# Patient Record
Sex: MALE | Race: BLACK OR AFRICAN AMERICAN | Employment: UNEMPLOYED | URBAN - METROPOLITAN AREA
[De-identification: names, ages, dates, MRNs, and addresses within clinical notes are randomized per-mention and may not be internally consistent; named-entity substitution may affect disease eponyms.]

---

## 2017-01-01 ENCOUNTER — HOSPITAL ENCOUNTER (INPATIENT)
Age: 57
LOS: 4 days | DRG: 720 | End: 2017-07-14
Attending: EMERGENCY MEDICINE | Admitting: INTERNAL MEDICINE
Payer: MEDICAID

## 2017-01-01 ENCOUNTER — APPOINTMENT (OUTPATIENT)
Dept: GENERAL RADIOLOGY | Age: 57
DRG: 720 | End: 2017-01-01
Attending: INTERNAL MEDICINE
Payer: MEDICAID

## 2017-01-01 ENCOUNTER — APPOINTMENT (OUTPATIENT)
Dept: CT IMAGING | Age: 57
DRG: 720 | End: 2017-01-01
Attending: EMERGENCY MEDICINE
Payer: MEDICAID

## 2017-01-01 ENCOUNTER — APPOINTMENT (OUTPATIENT)
Dept: ULTRASOUND IMAGING | Age: 57
DRG: 720 | End: 2017-01-01
Attending: EMERGENCY MEDICINE
Payer: MEDICAID

## 2017-01-01 ENCOUNTER — APPOINTMENT (OUTPATIENT)
Dept: ULTRASOUND IMAGING | Age: 57
DRG: 720 | End: 2017-01-01
Attending: INTERNAL MEDICINE
Payer: MEDICAID

## 2017-01-01 ENCOUNTER — APPOINTMENT (OUTPATIENT)
Dept: MRI IMAGING | Age: 57
DRG: 720 | End: 2017-01-01
Attending: INTERNAL MEDICINE
Payer: MEDICAID

## 2017-01-01 VITALS
HEIGHT: 70 IN | DIASTOLIC BLOOD PRESSURE: 46 MMHG | HEART RATE: 30 BPM | BODY MASS INDEX: 23.62 KG/M2 | TEMPERATURE: 96 F | SYSTOLIC BLOOD PRESSURE: 74 MMHG | WEIGHT: 165 LBS

## 2017-01-01 DIAGNOSIS — F10.288 ALCOHOL DEPENDENCE WITH OTHER ALCOHOL-INDUCED DISORDER (HCC): ICD-10-CM

## 2017-01-01 DIAGNOSIS — R53.81 DEBILITY: ICD-10-CM

## 2017-01-01 DIAGNOSIS — R45.1 AGITATION: ICD-10-CM

## 2017-01-01 DIAGNOSIS — K86.89 PANCREATIC MASS: ICD-10-CM

## 2017-01-01 DIAGNOSIS — Z71.89 COUNSELING REGARDING ADVANCED CARE PLANNING AND GOALS OF CARE: ICD-10-CM

## 2017-01-01 DIAGNOSIS — R41.82 ALTERED MENTAL STATUS, UNSPECIFIED ALTERED MENTAL STATUS TYPE: ICD-10-CM

## 2017-01-01 DIAGNOSIS — K76.82 HEPATIC ENCEPHALOPATHY: Primary | ICD-10-CM

## 2017-01-01 LAB
ALBUMIN SERPL BCP-MCNC: 1 G/DL (ref 3.5–5)
ALBUMIN SERPL BCP-MCNC: 1.1 G/DL (ref 3.5–5)
ALBUMIN SERPL BCP-MCNC: 1.1 G/DL (ref 3.5–5)
ALBUMIN SERPL BCP-MCNC: 1.4 G/DL (ref 3.5–5)
ALBUMIN SERPL BCP-MCNC: 1.6 G/DL (ref 3.5–5)
ALBUMIN/GLOB SERPL: 0.2 {RATIO} (ref 1.1–2.2)
ALBUMIN/GLOB SERPL: 0.2 {RATIO} (ref 1.1–2.2)
ALBUMIN/GLOB SERPL: 0.3 {RATIO} (ref 1.1–2.2)
ALBUMIN/GLOB SERPL: 0.3 {RATIO} (ref 1.1–2.2)
ALP SERPL-CCNC: 105 U/L (ref 45–117)
ALP SERPL-CCNC: 131 U/L (ref 45–117)
ALP SERPL-CCNC: 155 U/L (ref 45–117)
ALP SERPL-CCNC: 73 U/L (ref 45–117)
ALT SERPL-CCNC: 314 U/L (ref 12–78)
ALT SERPL-CCNC: 33 U/L (ref 12–78)
ALT SERPL-CCNC: 47 U/L (ref 12–78)
ALT SERPL-CCNC: 53 U/L (ref 12–78)
AMMONIA PLAS-SCNC: 111 UMOL/L
AMMONIA PLAS-SCNC: 120 UMOL/L
AMMONIA PLAS-SCNC: 30 UMOL/L
AMMONIA PLAS-SCNC: 93 UMOL/L
ANION GAP BLD CALC-SCNC: 10 MMOL/L (ref 5–15)
ANION GAP BLD CALC-SCNC: 12 MMOL/L (ref 5–15)
ANION GAP BLD CALC-SCNC: 12 MMOL/L (ref 5–15)
ANION GAP BLD CALC-SCNC: 15 MMOL/L (ref 5–15)
ANION GAP BLD CALC-SCNC: 22 MMOL/L (ref 5–15)
APPEARANCE UR: ABNORMAL
APTT PPP: 81.2 SEC (ref 22.1–32.5)
ARTERIAL PATENCY WRIST A: YES
AST SERPL W P-5'-P-CCNC: 115 U/L (ref 15–37)
AST SERPL W P-5'-P-CCNC: 1647 U/L (ref 15–37)
AST SERPL W P-5'-P-CCNC: 187 U/L (ref 15–37)
AST SERPL W P-5'-P-CCNC: 239 U/L (ref 15–37)
ATRIAL RATE: 77 BPM
BACTERIA URNS QL MICRO: NEGATIVE /HPF
BASE DEFICIT BLDA-SCNC: 13.9 MMOL/L
BASE DEFICIT BLDA-SCNC: 8.7 MMOL/L
BASE EXCESS BLDA CALC-SCNC: 1 MMOL/L
BASOPHILS # BLD AUTO: 0 K/UL (ref 0–0.1)
BASOPHILS # BLD AUTO: 0 K/UL (ref 0–0.1)
BASOPHILS # BLD: 0 % (ref 0–1)
BASOPHILS # BLD: 0 % (ref 0–1)
BDY SITE: ABNORMAL
BILIRUB DIRECT SERPL-MCNC: 12.2 MG/DL (ref 0–0.2)
BILIRUB SERPL-MCNC: 15.8 MG/DL (ref 0.2–1)
BILIRUB SERPL-MCNC: 16.9 MG/DL (ref 0.2–1)
BILIRUB SERPL-MCNC: 20.1 MG/DL (ref 0.2–1)
BILIRUB SERPL-MCNC: 21.3 MG/DL (ref 0.2–1)
BILIRUB UR QL CFM: POSITIVE
BREATHS.SPONTANEOUS ON VENT: 32
BUN SERPL-MCNC: 15 MG/DL (ref 6–20)
BUN SERPL-MCNC: 17 MG/DL (ref 6–20)
BUN SERPL-MCNC: 22 MG/DL (ref 6–20)
BUN SERPL-MCNC: 26 MG/DL (ref 6–20)
BUN SERPL-MCNC: 29 MG/DL (ref 6–20)
BUN/CREAT SERPL: 5 (ref 12–20)
BUN/CREAT SERPL: 7 (ref 12–20)
BUN/CREAT SERPL: 8 (ref 12–20)
BUN/CREAT SERPL: 9 (ref 12–20)
BUN/CREAT SERPL: 9 (ref 12–20)
CALCIUM SERPL-MCNC: 5 MG/DL (ref 8.5–10.1)
CALCIUM SERPL-MCNC: 5.5 MG/DL (ref 8.5–10.1)
CALCIUM SERPL-MCNC: 6.9 MG/DL (ref 8.5–10.1)
CALCIUM SERPL-MCNC: 7.6 MG/DL (ref 8.5–10.1)
CALCIUM SERPL-MCNC: 8 MG/DL (ref 8.5–10.1)
CALCULATED P AXIS, ECG09: 45 DEGREES
CALCULATED R AXIS, ECG10: 52 DEGREES
CALCULATED T AXIS, ECG11: 28 DEGREES
CHLORIDE SERPL-SCNC: 102 MMOL/L (ref 97–108)
CHLORIDE SERPL-SCNC: 109 MMOL/L (ref 97–108)
CHLORIDE SERPL-SCNC: 110 MMOL/L (ref 97–108)
CHLORIDE SERPL-SCNC: 93 MMOL/L (ref 97–108)
CHLORIDE SERPL-SCNC: 95 MMOL/L (ref 97–108)
CK MB CFR SERPL CALC: 4 % (ref 0–2.5)
CK MB SERPL-MCNC: 2.5 NG/ML (ref 5–25)
CK SERPL-CCNC: 62 U/L (ref 39–308)
CO2 SERPL-SCNC: 13 MMOL/L (ref 21–32)
CO2 SERPL-SCNC: 16 MMOL/L (ref 21–32)
CO2 SERPL-SCNC: 20 MMOL/L (ref 21–32)
CO2 SERPL-SCNC: 23 MMOL/L (ref 21–32)
CO2 SERPL-SCNC: 24 MMOL/L (ref 21–32)
COLOR UR: ABNORMAL
CREAT SERPL-MCNC: 1.69 MG/DL (ref 0.7–1.3)
CREAT SERPL-MCNC: 1.87 MG/DL (ref 0.7–1.3)
CREAT SERPL-MCNC: 2.61 MG/DL (ref 0.7–1.3)
CREAT SERPL-MCNC: 3.77 MG/DL (ref 0.7–1.3)
CREAT SERPL-MCNC: 5.55 MG/DL (ref 0.7–1.3)
CREAT UR-MCNC: 140 MG/DL
DIAGNOSIS, 93000: NORMAL
DIFFERENTIAL METHOD BLD: ABNORMAL
EOSINOPHIL # BLD: 0 K/UL (ref 0–0.4)
EOSINOPHIL # BLD: 0 K/UL (ref 0–0.4)
EOSINOPHIL NFR BLD: 0 % (ref 0–7)
EOSINOPHIL NFR BLD: 0 % (ref 0–7)
EPAP/CPAP/PEEP, PAPEEP: 6
EPAP/CPAP/PEEP, PAPEEP: 8
EPITH CASTS URNS QL MICRO: ABNORMAL /LPF
ERYTHROCYTE [DISTWIDTH] IN BLOOD BY AUTOMATED COUNT: 20.5 % (ref 11.5–14.5)
ERYTHROCYTE [DISTWIDTH] IN BLOOD BY AUTOMATED COUNT: 21.2 % (ref 11.5–14.5)
ERYTHROCYTE [DISTWIDTH] IN BLOOD BY AUTOMATED COUNT: 21.7 % (ref 11.5–14.5)
ERYTHROCYTE [DISTWIDTH] IN BLOOD BY AUTOMATED COUNT: 22.2 % (ref 11.5–14.5)
ERYTHROCYTE [DISTWIDTH] IN BLOOD BY AUTOMATED COUNT: 23.9 % (ref 11.5–14.5)
FERRITIN SERPL-MCNC: 1757 NG/ML (ref 26–388)
FIO2 ON VENT: 100 %
FIO2 ON VENT: 100 %
FOLATE SERPL-MCNC: 5.3 NG/ML (ref 5–21)
GAS FLOW.O2 O2 DELIVERY SYS: 4 L/MIN
GAS FLOW.O2 SETTING OXYMISER: 4 L/MIN
GLOBULIN SER CALC-MCNC: 4.3 G/DL (ref 2–4)
GLOBULIN SER CALC-MCNC: 5.2 G/DL (ref 2–4)
GLOBULIN SER CALC-MCNC: 6.1 G/DL (ref 2–4)
GLOBULIN SER CALC-MCNC: 7.5 G/DL (ref 2–4)
GLUCOSE BLD STRIP.AUTO-MCNC: 103 MG/DL (ref 65–100)
GLUCOSE BLD STRIP.AUTO-MCNC: 107 MG/DL (ref 65–100)
GLUCOSE BLD STRIP.AUTO-MCNC: 111 MG/DL (ref 65–100)
GLUCOSE BLD STRIP.AUTO-MCNC: 114 MG/DL (ref 65–100)
GLUCOSE BLD STRIP.AUTO-MCNC: 115 MG/DL (ref 65–100)
GLUCOSE BLD STRIP.AUTO-MCNC: 124 MG/DL (ref 65–100)
GLUCOSE BLD STRIP.AUTO-MCNC: 127 MG/DL (ref 65–100)
GLUCOSE BLD STRIP.AUTO-MCNC: 179 MG/DL (ref 65–100)
GLUCOSE BLD STRIP.AUTO-MCNC: 35 MG/DL (ref 65–100)
GLUCOSE BLD STRIP.AUTO-MCNC: 44 MG/DL (ref 65–100)
GLUCOSE BLD STRIP.AUTO-MCNC: 63 MG/DL (ref 65–100)
GLUCOSE BLD STRIP.AUTO-MCNC: 68 MG/DL (ref 65–100)
GLUCOSE BLD STRIP.AUTO-MCNC: 73 MG/DL (ref 65–100)
GLUCOSE BLD STRIP.AUTO-MCNC: 80 MG/DL (ref 65–100)
GLUCOSE BLD STRIP.AUTO-MCNC: 90 MG/DL (ref 65–100)
GLUCOSE BLD STRIP.AUTO-MCNC: 93 MG/DL (ref 65–100)
GLUCOSE SERPL-MCNC: 112 MG/DL (ref 65–100)
GLUCOSE SERPL-MCNC: 83 MG/DL (ref 65–100)
GLUCOSE SERPL-MCNC: 85 MG/DL (ref 65–100)
GLUCOSE SERPL-MCNC: 89 MG/DL (ref 65–100)
GLUCOSE SERPL-MCNC: 95 MG/DL (ref 65–100)
GLUCOSE UR STRIP.AUTO-MCNC: NEGATIVE MG/DL
HAV IGM SERPL QL IA: NONREACTIVE
HBV CORE IGM SER QL: NONREACTIVE
HBV SURFACE AG SER QL: <0.1 INDEX
HBV SURFACE AG SER QL: NEGATIVE
HCO3 BLDA-SCNC: 11 MMOL/L (ref 22–26)
HCO3 BLDA-SCNC: 19 MMOL/L (ref 22–26)
HCO3 BLDA-SCNC: 26 MMOL/L (ref 22–26)
HCT VFR BLD AUTO: 22.6 % (ref 36.6–50.3)
HCT VFR BLD AUTO: 25 % (ref 36.6–50.3)
HCT VFR BLD AUTO: 25.3 % (ref 36.6–50.3)
HCT VFR BLD AUTO: 26.6 % (ref 36.6–50.3)
HCT VFR BLD AUTO: 28.2 % (ref 36.6–50.3)
HCT VFR BLD AUTO: 28.7 % (ref 36.6–50.3)
HCT VFR BLD AUTO: 32.7 % (ref 36.6–50.3)
HCV AB SERPL QL IA: NONREACTIVE
HCV COMMENT,HCGAC: NORMAL
HGB BLD-MCNC: 10.4 G/DL (ref 12.1–17)
HGB BLD-MCNC: 10.5 G/DL (ref 12.1–17)
HGB BLD-MCNC: 12 G/DL (ref 12.1–17)
HGB BLD-MCNC: 8.3 G/DL (ref 12.1–17)
HGB BLD-MCNC: 8.9 G/DL (ref 12.1–17)
HGB BLD-MCNC: 9.3 G/DL (ref 12.1–17)
HGB BLD-MCNC: 9.3 G/DL (ref 12.1–17)
HGB UR QL STRIP: NEGATIVE
INR PPP: 3.5 (ref 0.9–1.1)
INR PPP: <0.9 (ref 0.9–1.1)
IPAP/PIP, IPAPIP: 12
IPAP/PIP, IPAPIP: 12
IRON SATN MFR SERPL: ABNORMAL % (ref 20–50)
IRON SERPL-MCNC: 62 UG/DL (ref 35–150)
KETONES UR QL STRIP.AUTO: 15 MG/DL
LACTATE SERPL-SCNC: 8.1 MMOL/L (ref 0.4–2)
LEUKOCYTE ESTERASE UR QL STRIP.AUTO: NEGATIVE
LIPASE SERPL-CCNC: 129 U/L (ref 73–393)
LYMPHOCYTES # BLD AUTO: 12 % (ref 12–49)
LYMPHOCYTES # BLD AUTO: 13 % (ref 12–49)
LYMPHOCYTES # BLD: 1.5 K/UL (ref 0.8–3.5)
LYMPHOCYTES # BLD: 1.6 K/UL (ref 0.8–3.5)
Lab: NORMAL
MAGNESIUM SERPL-MCNC: 2.2 MG/DL (ref 1.6–2.4)
MAGNESIUM SERPL-MCNC: 2.6 MG/DL (ref 1.6–2.4)
MCH RBC QN AUTO: 34 PG (ref 26–34)
MCH RBC QN AUTO: 34.2 PG (ref 26–34)
MCH RBC QN AUTO: 34.8 PG (ref 26–34)
MCH RBC QN AUTO: 35.8 PG (ref 26–34)
MCH RBC QN AUTO: 35.8 PG (ref 26–34)
MCHC RBC AUTO-ENTMCNC: 35 G/DL (ref 30–36.5)
MCHC RBC AUTO-ENTMCNC: 35.6 G/DL (ref 30–36.5)
MCHC RBC AUTO-ENTMCNC: 36.2 G/DL (ref 30–36.5)
MCHC RBC AUTO-ENTMCNC: 36.7 G/DL (ref 30–36.5)
MCHC RBC AUTO-ENTMCNC: 36.7 G/DL (ref 30–36.5)
MCV RBC AUTO: 102.3 FL (ref 80–99)
MCV RBC AUTO: 94.4 FL (ref 80–99)
MCV RBC AUTO: 94.8 FL (ref 80–99)
MCV RBC AUTO: 95.4 FL (ref 80–99)
MCV RBC AUTO: 97.4 FL (ref 80–99)
MONOCYTES # BLD: 1.3 K/UL (ref 0–1)
MONOCYTES # BLD: 1.5 K/UL (ref 0–1)
MONOCYTES NFR BLD AUTO: 11 % (ref 5–13)
MONOCYTES NFR BLD AUTO: 12 % (ref 5–13)
NEUTS SEG # BLD: 9.3 K/UL (ref 1.8–8)
NEUTS SEG # BLD: 9.9 K/UL (ref 1.8–8)
NEUTS SEG NFR BLD AUTO: 76 % (ref 32–75)
NEUTS SEG NFR BLD AUTO: 76 % (ref 32–75)
NITRITE UR QL STRIP.AUTO: NEGATIVE
OSMOLALITY UR: 324 MOSM/KG H2O
P-R INTERVAL, ECG05: 146 MS
PCO2 BLDA: 23 MMHG (ref 35–45)
PCO2 BLDA: 43 MMHG (ref 35–45)
PCO2 BLDA: 47 MMHG (ref 35–45)
PH BLDA: 7.23 [PH] (ref 7.35–7.45)
PH BLDA: 7.28 [PH] (ref 7.35–7.45)
PH BLDA: 7.4 [PH] (ref 7.35–7.45)
PH UR STRIP: 6.5 [PH] (ref 5–8)
PHOSPHATE SERPL-MCNC: 4.4 MG/DL (ref 2.6–4.7)
PHOSPHATE SERPL-MCNC: 5 MG/DL (ref 2.6–4.7)
PHOSPHATE SERPL-MCNC: 7.6 MG/DL (ref 2.6–4.7)
PHOSPHATE SERPL-MCNC: 7.8 MG/DL (ref 2.6–4.7)
PLATELET # BLD AUTO: 108 K/UL (ref 150–400)
PLATELET # BLD AUTO: 144 K/UL (ref 150–400)
PLATELET # BLD AUTO: 172 K/UL (ref 150–400)
PLATELET # BLD AUTO: 65 K/UL (ref 150–400)
PLATELET # BLD AUTO: 70 K/UL (ref 150–400)
PO2 BLDA: 55 MMHG (ref 80–100)
PO2 BLDA: 75 MMHG (ref 80–100)
PO2 BLDA: 76 MMHG (ref 80–100)
POTASSIUM SERPL-SCNC: 2.7 MMOL/L (ref 3.5–5.1)
POTASSIUM SERPL-SCNC: 3.1 MMOL/L (ref 3.5–5.1)
POTASSIUM SERPL-SCNC: 3.4 MMOL/L (ref 3.5–5.1)
POTASSIUM SERPL-SCNC: 3.6 MMOL/L (ref 3.5–5.1)
POTASSIUM SERPL-SCNC: 3.8 MMOL/L (ref 3.5–5.1)
PROT SERPL-MCNC: 5.4 G/DL (ref 6.4–8.2)
PROT SERPL-MCNC: 6.8 G/DL (ref 6.4–8.2)
PROT SERPL-MCNC: 7.2 G/DL (ref 6.4–8.2)
PROT SERPL-MCNC: 8.9 G/DL (ref 6.4–8.2)
PROT UR STRIP-MCNC: NEGATIVE MG/DL
PROTHROMBIN TIME: 36.4 SEC (ref 9–11.1)
PROTHROMBIN TIME: <9 SEC (ref 9–11.1)
Q-T INTERVAL, ECG07: 400 MS
QRS DURATION, ECG06: 66 MS
QTC CALCULATION (BEZET), ECG08: 452 MS
RBC # BLD AUTO: 2.32 M/UL (ref 4.1–5.7)
RBC # BLD AUTO: 2.6 M/UL (ref 4.1–5.7)
RBC # BLD AUTO: 2.62 M/UL (ref 4.1–5.7)
RBC # BLD AUTO: 3.04 M/UL (ref 4.1–5.7)
RBC # BLD AUTO: 3.45 M/UL (ref 4.1–5.7)
RBC #/AREA URNS HPF: ABNORMAL /HPF (ref 0–5)
RBC MORPH BLD: ABNORMAL
REFERENCE LAB,REFLB: NORMAL
SAO2 % BLD: 82 % (ref 92–97)
SAO2 % BLD: 94 % (ref 92–97)
SAO2 % BLD: 95 % (ref 92–97)
SAO2% DEVICE SAO2% SENSOR NAME: ABNORMAL
SERVICE CMNT-IMP: ABNORMAL
SERVICE CMNT-IMP: NORMAL
SODIUM SERPL-SCNC: 125 MMOL/L (ref 136–145)
SODIUM SERPL-SCNC: 129 MMOL/L (ref 136–145)
SODIUM SERPL-SCNC: 133 MMOL/L (ref 136–145)
SODIUM SERPL-SCNC: 144 MMOL/L (ref 136–145)
SODIUM SERPL-SCNC: 145 MMOL/L (ref 136–145)
SODIUM UR-SCNC: 11 MMOL/L
SP GR UR REFRACTOMETRY: 1.01 (ref 1–1.03)
SP1: NORMAL
SP2: NORMAL
SP3: NORMAL
SPECIMEN SITE: ABNORMAL
TEST DESCRIPTION:,ATST: NORMAL
THERAPEUTIC RANGE,PTTT: ABNORMAL SECS (ref 58–77)
TIBC SERPL-MCNC: 59 UG/DL (ref 250–450)
TROPONIN I SERPL-MCNC: <0.04 NG/ML
UROBILINOGEN UR QL STRIP.AUTO: 2 EU/DL (ref 0.2–1)
VANCOMYCIN SERPL-MCNC: 22 UG/ML
VENTILATION MODE VENT: ABNORMAL
VENTILATION MODE VENT: ABNORMAL
VENTRICULAR RATE, ECG03: 77 BPM
VIT B12 SERPL-MCNC: >2000 PG/ML (ref 211–911)
WBC # BLD AUTO: 12.2 K/UL (ref 4.1–11.1)
WBC # BLD AUTO: 12.9 K/UL (ref 4.1–11.1)
WBC # BLD AUTO: 14.7 K/UL (ref 4.1–11.1)
WBC # BLD AUTO: 19.8 K/UL (ref 4.1–11.1)
WBC # BLD AUTO: 24.6 K/UL (ref 4.1–11.1)
WBC MORPH BLD: ABNORMAL
WBC URNS QL MICRO: ABNORMAL /HPF (ref 0–4)

## 2017-01-01 PROCEDURE — 85027 COMPLETE CBC AUTOMATED: CPT | Performed by: INTERNAL MEDICINE

## 2017-01-01 PROCEDURE — 71010 XR CHEST PORT: CPT

## 2017-01-01 PROCEDURE — 94003 VENT MGMT INPAT SUBQ DAY: CPT

## 2017-01-01 PROCEDURE — 74011250636 HC RX REV CODE- 250/636: Performed by: INTERNAL MEDICINE

## 2017-01-01 PROCEDURE — 74011250636 HC RX REV CODE- 250/636: Performed by: EMERGENCY MEDICINE

## 2017-01-01 PROCEDURE — 65660000000 HC RM CCU STEPDOWN

## 2017-01-01 PROCEDURE — 74011000258 HC RX REV CODE- 258: Performed by: INTERNAL MEDICINE

## 2017-01-01 PROCEDURE — 74011000250 HC RX REV CODE- 250: Performed by: INTERNAL MEDICINE

## 2017-01-01 PROCEDURE — 36569 INSJ PICC 5 YR+ W/O IMAGING: CPT | Performed by: INTERNAL MEDICINE

## 2017-01-01 PROCEDURE — 84484 ASSAY OF TROPONIN QUANT: CPT | Performed by: EMERGENCY MEDICINE

## 2017-01-01 PROCEDURE — 94002 VENT MGMT INPAT INIT DAY: CPT

## 2017-01-01 PROCEDURE — 80074 ACUTE HEPATITIS PANEL: CPT | Performed by: INTERNAL MEDICINE

## 2017-01-01 PROCEDURE — 82140 ASSAY OF AMMONIA: CPT | Performed by: EMERGENCY MEDICINE

## 2017-01-01 PROCEDURE — 85730 THROMBOPLASTIN TIME PARTIAL: CPT | Performed by: EMERGENCY MEDICINE

## 2017-01-01 PROCEDURE — 74011250637 HC RX REV CODE- 250/637: Performed by: EMERGENCY MEDICINE

## 2017-01-01 PROCEDURE — 82803 BLOOD GASES ANY COMBINATION: CPT | Performed by: INTERNAL MEDICINE

## 2017-01-01 PROCEDURE — 81001 URINALYSIS AUTO W/SCOPE: CPT | Performed by: EMERGENCY MEDICINE

## 2017-01-01 PROCEDURE — 36415 COLL VENOUS BLD VENIPUNCTURE: CPT | Performed by: INTERNAL MEDICINE

## 2017-01-01 PROCEDURE — 94761 N-INVAS EAR/PLS OXIMETRY MLT: CPT

## 2017-01-01 PROCEDURE — 93005 ELECTROCARDIOGRAM TRACING: CPT

## 2017-01-01 PROCEDURE — 84300 ASSAY OF URINE SODIUM: CPT | Performed by: INTERNAL MEDICINE

## 2017-01-01 PROCEDURE — 80053 COMPREHEN METABOLIC PANEL: CPT | Performed by: EMERGENCY MEDICINE

## 2017-01-01 PROCEDURE — 76450000000

## 2017-01-01 PROCEDURE — 80053 COMPREHEN METABOLIC PANEL: CPT | Performed by: INTERNAL MEDICINE

## 2017-01-01 PROCEDURE — 36600 WITHDRAWAL OF ARTERIAL BLOOD: CPT | Performed by: INTERNAL MEDICINE

## 2017-01-01 PROCEDURE — 77030012879 HC MSK CPAP FLL FAC PHIL -B

## 2017-01-01 PROCEDURE — 74011250637 HC RX REV CODE- 250/637: Performed by: INTERNAL MEDICINE

## 2017-01-01 PROCEDURE — 77030018719 HC DRSG PTCH ANTIMIC J&J -A

## 2017-01-01 PROCEDURE — 74011636320 HC RX REV CODE- 636/320: Performed by: EMERGENCY MEDICINE

## 2017-01-01 PROCEDURE — 80202 ASSAY OF VANCOMYCIN: CPT | Performed by: INTERNAL MEDICINE

## 2017-01-01 PROCEDURE — 82550 ASSAY OF CK (CPK): CPT | Performed by: EMERGENCY MEDICINE

## 2017-01-01 PROCEDURE — 80069 RENAL FUNCTION PANEL: CPT | Performed by: INTERNAL MEDICINE

## 2017-01-01 PROCEDURE — 83735 ASSAY OF MAGNESIUM: CPT | Performed by: INTERNAL MEDICINE

## 2017-01-01 PROCEDURE — 82962 GLUCOSE BLOOD TEST: CPT

## 2017-01-01 PROCEDURE — 80048 BASIC METABOLIC PNL TOTAL CA: CPT | Performed by: INTERNAL MEDICINE

## 2017-01-01 PROCEDURE — 51798 US URINE CAPACITY MEASURE: CPT

## 2017-01-01 PROCEDURE — 5A09457 ASSISTANCE WITH RESPIRATORY VENTILATION, 24-96 CONSECUTIVE HOURS, CONTINUOUS POSITIVE AIRWAY PRESSURE: ICD-10-PCS | Performed by: INTERNAL MEDICINE

## 2017-01-01 PROCEDURE — 82746 ASSAY OF FOLIC ACID SERUM: CPT | Performed by: INTERNAL MEDICINE

## 2017-01-01 PROCEDURE — 77030018786 HC NDL GD F/USND BARD -B

## 2017-01-01 PROCEDURE — C9113 INJ PANTOPRAZOLE SODIUM, VIA: HCPCS | Performed by: INTERNAL MEDICINE

## 2017-01-01 PROCEDURE — 65610000006 HC RM INTENSIVE CARE

## 2017-01-01 PROCEDURE — 83735 ASSAY OF MAGNESIUM: CPT | Performed by: EMERGENCY MEDICINE

## 2017-01-01 PROCEDURE — 76705 ECHO EXAM OF ABDOMEN: CPT

## 2017-01-01 PROCEDURE — 87040 BLOOD CULTURE FOR BACTERIA: CPT | Performed by: INTERNAL MEDICINE

## 2017-01-01 PROCEDURE — 74011000258 HC RX REV CODE- 258: Performed by: EMERGENCY MEDICINE

## 2017-01-01 PROCEDURE — 82570 ASSAY OF URINE CREATININE: CPT | Performed by: INTERNAL MEDICINE

## 2017-01-01 PROCEDURE — 83540 ASSAY OF IRON: CPT | Performed by: INTERNAL MEDICINE

## 2017-01-01 PROCEDURE — 82607 VITAMIN B-12: CPT | Performed by: INTERNAL MEDICINE

## 2017-01-01 PROCEDURE — 85610 PROTHROMBIN TIME: CPT | Performed by: INTERNAL MEDICINE

## 2017-01-01 PROCEDURE — 76937 US GUIDE VASCULAR ACCESS: CPT

## 2017-01-01 PROCEDURE — 82140 ASSAY OF AMMONIA: CPT | Performed by: INTERNAL MEDICINE

## 2017-01-01 PROCEDURE — 83935 ASSAY OF URINE OSMOLALITY: CPT | Performed by: INTERNAL MEDICINE

## 2017-01-01 PROCEDURE — 85610 PROTHROMBIN TIME: CPT | Performed by: EMERGENCY MEDICINE

## 2017-01-01 PROCEDURE — 77030019563 HC DEV ATTCH FEED HOLL -A

## 2017-01-01 PROCEDURE — 84100 ASSAY OF PHOSPHORUS: CPT | Performed by: INTERNAL MEDICINE

## 2017-01-01 PROCEDURE — 94660 CPAP INITIATION&MGMT: CPT

## 2017-01-01 PROCEDURE — 36415 COLL VENOUS BLD VENIPUNCTURE: CPT | Performed by: EMERGENCY MEDICINE

## 2017-01-01 PROCEDURE — 83605 ASSAY OF LACTIC ACID: CPT | Performed by: INTERNAL MEDICINE

## 2017-01-01 PROCEDURE — 85018 HEMOGLOBIN: CPT | Performed by: INTERNAL MEDICINE

## 2017-01-01 PROCEDURE — 74177 CT ABD & PELVIS W/CONTRAST: CPT

## 2017-01-01 PROCEDURE — 83690 ASSAY OF LIPASE: CPT | Performed by: EMERGENCY MEDICINE

## 2017-01-01 PROCEDURE — 80076 HEPATIC FUNCTION PANEL: CPT | Performed by: INTERNAL MEDICINE

## 2017-01-01 PROCEDURE — C1751 CATH, INF, PER/CENT/MIDLINE: HCPCS

## 2017-01-01 PROCEDURE — 74000 XR ABD (KUB): CPT

## 2017-01-01 PROCEDURE — 77030008771 HC TU NG SALEM SUMP -A

## 2017-01-01 PROCEDURE — 77030020847 HC STATLOK BARD -A

## 2017-01-01 PROCEDURE — 82728 ASSAY OF FERRITIN: CPT | Performed by: INTERNAL MEDICINE

## 2017-01-01 PROCEDURE — 76770 US EXAM ABDO BACK WALL COMP: CPT

## 2017-01-01 PROCEDURE — P9045 ALBUMIN (HUMAN), 5%, 250 ML: HCPCS | Performed by: INTERNAL MEDICINE

## 2017-01-01 PROCEDURE — 85025 COMPLETE CBC W/AUTO DIFF WBC: CPT | Performed by: EMERGENCY MEDICINE

## 2017-01-01 PROCEDURE — 99285 EMERGENCY DEPT VISIT HI MDM: CPT

## 2017-01-01 PROCEDURE — 77030011943

## 2017-01-01 PROCEDURE — 85025 COMPLETE CBC W/AUTO DIFF WBC: CPT | Performed by: INTERNAL MEDICINE

## 2017-01-01 RX ORDER — DEXTROSE 50 % IN WATER (D50W) INTRAVENOUS SYRINGE
12.5-25 AS NEEDED
Status: DISCONTINUED | OUTPATIENT
Start: 2017-01-01 | End: 2017-01-01 | Stop reason: HOSPADM

## 2017-01-01 RX ORDER — SODIUM BICARBONATE 1 MEQ/ML
SYRINGE (ML) INTRAVENOUS
Status: DISCONTINUED
Start: 2017-01-01 | End: 2017-01-01

## 2017-01-01 RX ORDER — SODIUM CHLORIDE 0.9 % (FLUSH) 0.9 %
10 SYRINGE (ML) INJECTION
Status: COMPLETED | OUTPATIENT
Start: 2017-01-01 | End: 2017-01-01

## 2017-01-01 RX ORDER — IPRATROPIUM BROMIDE AND ALBUTEROL SULFATE 2.5; .5 MG/3ML; MG/3ML
3 SOLUTION RESPIRATORY (INHALATION)
Status: DISCONTINUED | OUTPATIENT
Start: 2017-01-01 | End: 2017-01-01 | Stop reason: HOSPADM

## 2017-01-01 RX ORDER — ONDANSETRON 2 MG/ML
4 INJECTION INTRAMUSCULAR; INTRAVENOUS
Status: DISCONTINUED | OUTPATIENT
Start: 2017-01-01 | End: 2017-01-01 | Stop reason: HOSPADM

## 2017-01-01 RX ORDER — HYDROCORTISONE SODIUM SUCCINATE 100 MG/2ML
50 INJECTION, POWDER, FOR SOLUTION INTRAMUSCULAR; INTRAVENOUS EVERY 8 HOURS
Status: DISCONTINUED | OUTPATIENT
Start: 2017-01-01 | End: 2017-01-01 | Stop reason: HOSPADM

## 2017-01-01 RX ORDER — SODIUM CHLORIDE 0.9 % (FLUSH) 0.9 %
20 SYRINGE (ML) INJECTION AS NEEDED
Status: DISCONTINUED | OUTPATIENT
Start: 2017-01-01 | End: 2017-01-01 | Stop reason: HOSPADM

## 2017-01-01 RX ORDER — PHYTONADIONE 10 MG/ML
1 INJECTION, EMULSION INTRAMUSCULAR; INTRAVENOUS; SUBCUTANEOUS 2 TIMES DAILY
Status: COMPLETED | OUTPATIENT
Start: 2017-01-01 | End: 2017-01-01

## 2017-01-01 RX ORDER — SODIUM CHLORIDE 0.9 % (FLUSH) 0.9 %
10 SYRINGE (ML) INJECTION AS NEEDED
Status: DISCONTINUED | OUTPATIENT
Start: 2017-01-01 | End: 2017-01-01 | Stop reason: HOSPADM

## 2017-01-01 RX ORDER — LORAZEPAM 2 MG/ML
1-2 INJECTION INTRAMUSCULAR
Status: DISCONTINUED | OUTPATIENT
Start: 2017-01-01 | End: 2017-01-01 | Stop reason: HOSPADM

## 2017-01-01 RX ORDER — HYDRALAZINE HYDROCHLORIDE 20 MG/ML
10 INJECTION INTRAMUSCULAR; INTRAVENOUS
Status: DISCONTINUED | OUTPATIENT
Start: 2017-01-01 | End: 2017-01-01

## 2017-01-01 RX ORDER — SODIUM CHLORIDE 9 MG/ML
125 INJECTION, SOLUTION INTRAVENOUS CONTINUOUS
Status: DISCONTINUED | OUTPATIENT
Start: 2017-01-01 | End: 2017-01-01

## 2017-01-01 RX ORDER — DEXTROSE MONOHYDRATE AND SODIUM CHLORIDE 5; .9 G/100ML; G/100ML
50 INJECTION, SOLUTION INTRAVENOUS CONTINUOUS
Status: DISCONTINUED | OUTPATIENT
Start: 2017-01-01 | End: 2017-01-01 | Stop reason: HOSPADM

## 2017-01-01 RX ORDER — SODIUM CHLORIDE 0.9 % (FLUSH) 0.9 %
5-10 SYRINGE (ML) INJECTION EVERY 8 HOURS
Status: DISCONTINUED | OUTPATIENT
Start: 2017-01-01 | End: 2017-01-01 | Stop reason: SDUPTHER

## 2017-01-01 RX ORDER — SODIUM CHLORIDE 0.9 % (FLUSH) 0.9 %
10-40 SYRINGE (ML) INJECTION EVERY 8 HOURS
Status: DISCONTINUED | OUTPATIENT
Start: 2017-01-01 | End: 2017-01-01 | Stop reason: HOSPADM

## 2017-01-01 RX ORDER — HEPARIN 100 UNIT/ML
300 SYRINGE INTRAVENOUS AS NEEDED
Status: DISCONTINUED | OUTPATIENT
Start: 2017-01-01 | End: 2017-01-01 | Stop reason: HOSPADM

## 2017-01-01 RX ORDER — POTASSIUM CHLORIDE 29.8 MG/ML
20 INJECTION INTRAVENOUS ONCE
Status: COMPLETED | OUTPATIENT
Start: 2017-01-01 | End: 2017-01-01

## 2017-01-01 RX ORDER — SODIUM BICARBONATE 1 MEQ/ML
100 SYRINGE (ML) INTRAVENOUS ONCE
Status: COMPLETED | OUTPATIENT
Start: 2017-01-01 | End: 2017-01-01

## 2017-01-01 RX ORDER — SODIUM CHLORIDE 9 MG/ML
50 INJECTION, SOLUTION INTRAVENOUS
Status: COMPLETED | OUTPATIENT
Start: 2017-01-01 | End: 2017-01-01

## 2017-01-01 RX ORDER — MUPIROCIN 20 MG/G
OINTMENT TOPICAL 2 TIMES DAILY
Status: DISCONTINUED | OUTPATIENT
Start: 2017-01-01 | End: 2017-01-01 | Stop reason: HOSPADM

## 2017-01-01 RX ORDER — HEPARIN SODIUM 5000 [USP'U]/ML
5000 INJECTION, SOLUTION INTRAVENOUS; SUBCUTANEOUS EVERY 12 HOURS
Status: DISCONTINUED | OUTPATIENT
Start: 2017-01-01 | End: 2017-01-01

## 2017-01-01 RX ORDER — VANCOMYCIN 2 GRAM/500 ML IN 0.9 % SODIUM CHLORIDE INTRAVENOUS
2000 ONCE
Status: COMPLETED | OUTPATIENT
Start: 2017-01-01 | End: 2017-01-01

## 2017-01-01 RX ORDER — SODIUM CHLORIDE 0.9 % (FLUSH) 0.9 %
5-10 SYRINGE (ML) INJECTION AS NEEDED
Status: DISCONTINUED | OUTPATIENT
Start: 2017-01-01 | End: 2017-01-01 | Stop reason: HOSPADM

## 2017-01-01 RX ORDER — SODIUM CHLORIDE 0.9 % (FLUSH) 0.9 %
10-30 SYRINGE (ML) INJECTION AS NEEDED
Status: DISCONTINUED | OUTPATIENT
Start: 2017-01-01 | End: 2017-01-01 | Stop reason: HOSPADM

## 2017-01-01 RX ORDER — POTASSIUM CHLORIDE 750 MG/1
40 TABLET, FILM COATED, EXTENDED RELEASE ORAL
Status: COMPLETED | OUTPATIENT
Start: 2017-01-01 | End: 2017-01-01

## 2017-01-01 RX ORDER — POTASSIUM CHLORIDE 1.5 G/1.77G
20 POWDER, FOR SOLUTION ORAL
Status: COMPLETED | OUTPATIENT
Start: 2017-01-01 | End: 2017-01-01

## 2017-01-01 RX ORDER — ALBUMIN HUMAN 50 G/1000ML
25 SOLUTION INTRAVENOUS ONCE
Status: COMPLETED | OUTPATIENT
Start: 2017-01-01 | End: 2017-01-01

## 2017-01-01 RX ORDER — SODIUM CHLORIDE 0.9 % (FLUSH) 0.9 %
5-10 SYRINGE (ML) INJECTION EVERY 8 HOURS
Status: DISCONTINUED | OUTPATIENT
Start: 2017-01-01 | End: 2017-01-01 | Stop reason: HOSPADM

## 2017-01-01 RX ORDER — MAGNESIUM SULFATE 100 %
4 CRYSTALS MISCELLANEOUS AS NEEDED
Status: DISCONTINUED | OUTPATIENT
Start: 2017-01-01 | End: 2017-01-01 | Stop reason: HOSPADM

## 2017-01-01 RX ORDER — SODIUM CHLORIDE 0.9 % (FLUSH) 0.9 %
10 SYRINGE (ML) INJECTION EVERY 24 HOURS
Status: DISCONTINUED | OUTPATIENT
Start: 2017-01-01 | End: 2017-01-01 | Stop reason: HOSPADM

## 2017-01-01 RX ORDER — SODIUM CHLORIDE 0.9 % (FLUSH) 0.9 %
5-10 SYRINGE (ML) INJECTION AS NEEDED
Status: DISCONTINUED | OUTPATIENT
Start: 2017-01-01 | End: 2017-01-01 | Stop reason: SDUPTHER

## 2017-01-01 RX ADMIN — LACTULOSE 30 G: 10 SOLUTION ORAL at 21:02

## 2017-01-01 RX ADMIN — SODIUM CHLORIDE 50 ML/HR: 900 INJECTION, SOLUTION INTRAVENOUS at 16:14

## 2017-01-01 RX ADMIN — MUPIROCIN: 20 OINTMENT TOPICAL at 22:04

## 2017-01-01 RX ADMIN — Medication 10 ML: at 17:37

## 2017-01-01 RX ADMIN — PIPERACILLIN SODIUM,TAZOBACTAM SODIUM 3.38 G: 3; .375 INJECTION, POWDER, FOR SOLUTION INTRAVENOUS at 01:13

## 2017-01-01 RX ADMIN — POTASSIUM CHLORIDE 20 MEQ: 1.5 POWDER, FOR SOLUTION ORAL at 14:50

## 2017-01-01 RX ADMIN — SODIUM CHLORIDE 75 ML/HR: 900 INJECTION, SOLUTION INTRAVENOUS at 03:23

## 2017-01-01 RX ADMIN — Medication 10 ML: at 21:03

## 2017-01-01 RX ADMIN — LACTULOSE 30 G: 10 SOLUTION ORAL at 17:59

## 2017-01-01 RX ADMIN — PHENYLEPHRINE HYDROCHLORIDE 150 MCG/MIN: 10 INJECTION INTRAVENOUS at 19:32

## 2017-01-01 RX ADMIN — Medication 10 ML: at 14:00

## 2017-01-01 RX ADMIN — CALCIUM GLUCONATE 2 G: 94 INJECTION, SOLUTION INTRAVENOUS at 15:59

## 2017-01-01 RX ADMIN — PHENYLEPHRINE HYDROCHLORIDE 170 MCG/MIN: 10 INJECTION INTRAVENOUS at 19:35

## 2017-01-01 RX ADMIN — PHENYLEPHRINE HYDROCHLORIDE 300 MCG/MIN: 10 INJECTION INTRAVENOUS at 03:28

## 2017-01-01 RX ADMIN — ALBUMIN (HUMAN) 25 G: 12.5 INJECTION, SOLUTION INTRAVENOUS at 18:57

## 2017-01-01 RX ADMIN — PHYTONADIONE 1 MG: 10 INJECTION, EMULSION INTRAMUSCULAR; INTRAVENOUS; SUBCUTANEOUS at 19:51

## 2017-01-01 RX ADMIN — PHYTONADIONE 1 MG: 10 INJECTION, EMULSION INTRAMUSCULAR; INTRAVENOUS; SUBCUTANEOUS at 14:03

## 2017-01-01 RX ADMIN — SODIUM BICARBONATE: 84 INJECTION, SOLUTION INTRAVENOUS at 23:46

## 2017-01-01 RX ADMIN — DEXTROSE MONOHYDRATE 12.5 G: 25 INJECTION, SOLUTION INTRAVENOUS at 23:37

## 2017-01-01 RX ADMIN — PHENYLEPHRINE HYDROCHLORIDE 170 MCG/MIN: 10 INJECTION INTRAVENOUS at 13:41

## 2017-01-01 RX ADMIN — LORAZEPAM 1 MG: 2 INJECTION INTRAMUSCULAR; INTRAVENOUS at 17:37

## 2017-01-01 RX ADMIN — SODIUM CHLORIDE 40 MG: 9 INJECTION INTRAMUSCULAR; INTRAVENOUS; SUBCUTANEOUS at 08:15

## 2017-01-01 RX ADMIN — DEXTROSE MONOHYDRATE AND SODIUM CHLORIDE 100 ML/HR: 5; .9 INJECTION, SOLUTION INTRAVENOUS at 13:46

## 2017-01-01 RX ADMIN — NOREPINEPHRINE BITARTRATE 2 MCG/MIN: 1 INJECTION, SOLUTION, CONCENTRATE INTRAVENOUS at 22:47

## 2017-01-01 RX ADMIN — HYDROCORTISONE SODIUM SUCCINATE 50 MG: 100 INJECTION, POWDER, FOR SOLUTION INTRAMUSCULAR; INTRAVENOUS at 05:15

## 2017-01-01 RX ADMIN — SODIUM CHLORIDE 125 ML/HR: 900 INJECTION, SOLUTION INTRAVENOUS at 03:50

## 2017-01-01 RX ADMIN — Medication 10 ML: at 17:36

## 2017-01-01 RX ADMIN — PIPERACILLIN SODIUM,TAZOBACTAM SODIUM 3.38 G: 3; .375 INJECTION, POWDER, FOR SOLUTION INTRAVENOUS at 16:42

## 2017-01-01 RX ADMIN — POTASSIUM PHOSPHATE, MONOBASIC AND POTASSIUM PHOSPHATE, DIBASIC: 224; 236 INJECTION, SOLUTION INTRAVENOUS at 22:11

## 2017-01-01 RX ADMIN — SODIUM BICARBONATE: 84 INJECTION, SOLUTION INTRAVENOUS at 07:39

## 2017-01-01 RX ADMIN — PIPERACILLIN SODIUM,TAZOBACTAM SODIUM 3.38 G: 3; .375 INJECTION, POWDER, FOR SOLUTION INTRAVENOUS at 00:53

## 2017-01-01 RX ADMIN — PHENYLEPHRINE HYDROCHLORIDE 170 MCG/MIN: 10 INJECTION INTRAVENOUS at 04:35

## 2017-01-01 RX ADMIN — SODIUM CHLORIDE 40 MG: 9 INJECTION INTRAMUSCULAR; INTRAVENOUS; SUBCUTANEOUS at 21:02

## 2017-01-01 RX ADMIN — PHENYLEPHRINE HYDROCHLORIDE 170 MCG/MIN: 10 INJECTION INTRAVENOUS at 10:38

## 2017-01-01 RX ADMIN — HYDROCORTISONE SODIUM SUCCINATE 50 MG: 100 INJECTION, POWDER, FOR SOLUTION INTRAMUSCULAR; INTRAVENOUS at 05:48

## 2017-01-01 RX ADMIN — LACTULOSE 30 G: 10 SOLUTION ORAL at 15:49

## 2017-01-01 RX ADMIN — SODIUM BICARBONATE 100 MEQ: 84 INJECTION, SOLUTION INTRAVENOUS at 17:39

## 2017-01-01 RX ADMIN — DEXTROSE MONOHYDRATE AND SODIUM CHLORIDE 50 ML/HR: 5; .9 INJECTION, SOLUTION INTRAVENOUS at 10:35

## 2017-01-01 RX ADMIN — PHENYLEPHRINE HYDROCHLORIDE 170 MCG/MIN: 10 INJECTION INTRAVENOUS at 22:24

## 2017-01-01 RX ADMIN — PIPERACILLIN SODIUM,TAZOBACTAM SODIUM 3.38 G: 3; .375 INJECTION, POWDER, FOR SOLUTION INTRAVENOUS at 18:22

## 2017-01-01 RX ADMIN — IOPAMIDOL 100 ML: 755 INJECTION, SOLUTION INTRAVENOUS at 16:14

## 2017-01-01 RX ADMIN — DEXTROSE MONOHYDRATE 25 G: 25 INJECTION, SOLUTION INTRAVENOUS at 13:10

## 2017-01-01 RX ADMIN — SODIUM CHLORIDE 40 MG: 9 INJECTION INTRAMUSCULAR; INTRAVENOUS; SUBCUTANEOUS at 10:08

## 2017-01-01 RX ADMIN — POTASSIUM CHLORIDE 20 MEQ: 400 INJECTION, SOLUTION INTRAVENOUS at 08:42

## 2017-01-01 RX ADMIN — Medication 10 ML: at 16:14

## 2017-01-01 RX ADMIN — LORAZEPAM 2 MG: 2 INJECTION INTRAMUSCULAR; INTRAVENOUS at 18:52

## 2017-01-01 RX ADMIN — SODIUM CHLORIDE 40 MG: 9 INJECTION INTRAMUSCULAR; INTRAVENOUS; SUBCUTANEOUS at 08:42

## 2017-01-01 RX ADMIN — SODIUM BICARBONATE: 84 INJECTION, SOLUTION INTRAVENOUS at 15:57

## 2017-01-01 RX ADMIN — MUPIROCIN: 20 OINTMENT TOPICAL at 08:31

## 2017-01-01 RX ADMIN — SODIUM CHLORIDE 40 MG: 9 INJECTION INTRAMUSCULAR; INTRAVENOUS; SUBCUTANEOUS at 08:27

## 2017-01-01 RX ADMIN — PIPERACILLIN SODIUM,TAZOBACTAM SODIUM 3.38 G: 3; .375 INJECTION, POWDER, FOR SOLUTION INTRAVENOUS at 01:01

## 2017-01-01 RX ADMIN — HYDROCORTISONE SODIUM SUCCINATE 50 MG: 100 INJECTION, POWDER, FOR SOLUTION INTRAMUSCULAR; INTRAVENOUS at 21:03

## 2017-01-01 RX ADMIN — Medication 10 ML: at 05:15

## 2017-01-01 RX ADMIN — PIPERACILLIN SODIUM,TAZOBACTAM SODIUM 3.38 G: 3; .375 INJECTION, POWDER, FOR SOLUTION INTRAVENOUS at 09:33

## 2017-01-01 RX ADMIN — Medication 10 ML: at 19:43

## 2017-01-01 RX ADMIN — PHENYLEPHRINE HYDROCHLORIDE 170 MCG/MIN: 10 INJECTION INTRAVENOUS at 16:35

## 2017-01-01 RX ADMIN — PHENYLEPHRINE HYDROCHLORIDE 170 MCG/MIN: 10 INJECTION INTRAVENOUS at 01:27

## 2017-01-01 RX ADMIN — SODIUM CHLORIDE 1000 ML: 900 INJECTION, SOLUTION INTRAVENOUS at 10:58

## 2017-01-01 RX ADMIN — Medication 10 ML: at 20:00

## 2017-01-01 RX ADMIN — PHENYLEPHRINE HYDROCHLORIDE 300 MCG/MIN: 10 INJECTION INTRAVENOUS at 22:05

## 2017-01-01 RX ADMIN — SODIUM CHLORIDE 125 ML/HR: 900 INJECTION, SOLUTION INTRAVENOUS at 11:59

## 2017-01-01 RX ADMIN — METHYLPREDNISOLONE SODIUM SUCCINATE 20 MG: 40 INJECTION, POWDER, FOR SOLUTION INTRAMUSCULAR; INTRAVENOUS at 13:59

## 2017-01-01 RX ADMIN — PHENYLEPHRINE HYDROCHLORIDE 120 MCG/MIN: 10 INJECTION INTRAVENOUS at 13:28

## 2017-01-01 RX ADMIN — LACTULOSE 30 G: 10 SOLUTION ORAL at 21:18

## 2017-01-01 RX ADMIN — MUPIROCIN: 20 OINTMENT TOPICAL at 09:00

## 2017-01-01 RX ADMIN — LACTULOSE 30 G: 10 SOLUTION ORAL at 08:42

## 2017-01-01 RX ADMIN — PHENYLEPHRINE HYDROCHLORIDE 140 MCG/MIN: 10 INJECTION INTRAVENOUS at 16:09

## 2017-01-01 RX ADMIN — SODIUM BICARBONATE 100 MEQ: 84 INJECTION, SOLUTION INTRAVENOUS at 23:15

## 2017-01-01 RX ADMIN — HYDROCORTISONE SODIUM SUCCINATE 50 MG: 100 INJECTION, POWDER, FOR SOLUTION INTRAMUSCULAR; INTRAVENOUS at 19:42

## 2017-01-01 RX ADMIN — PHENYLEPHRINE HYDROCHLORIDE 120 MCG/MIN: 10 INJECTION INTRAVENOUS at 15:36

## 2017-01-01 RX ADMIN — LACTULOSE 30 G: 10 SOLUTION ORAL at 10:08

## 2017-01-01 RX ADMIN — LACTULOSE 30 G: 10 SOLUTION ORAL at 22:30

## 2017-01-01 RX ADMIN — VANCOMYCIN HYDROCHLORIDE 2000 MG: 10 INJECTION, POWDER, LYOPHILIZED, FOR SOLUTION INTRAVENOUS at 14:33

## 2017-01-01 RX ADMIN — SODIUM CHLORIDE 40 MG: 9 INJECTION INTRAMUSCULAR; INTRAVENOUS; SUBCUTANEOUS at 20:00

## 2017-01-01 RX ADMIN — PHENYLEPHRINE HYDROCHLORIDE 170 MCG/MIN: 10 INJECTION INTRAVENOUS at 07:37

## 2017-01-01 RX ADMIN — MUPIROCIN: 20 OINTMENT TOPICAL at 20:21

## 2017-01-01 RX ADMIN — LACTULOSE 30 G: 10 SOLUTION ORAL at 17:03

## 2017-01-01 RX ADMIN — POTASSIUM CHLORIDE 40 MEQ: 750 TABLET, FILM COATED, EXTENDED RELEASE ORAL at 18:23

## 2017-01-01 RX ADMIN — PHENYLEPHRINE HYDROCHLORIDE 30 MCG/MIN: 10 INJECTION INTRAVENOUS at 09:26

## 2017-01-01 RX ADMIN — METHYLPREDNISOLONE SODIUM SUCCINATE 20 MG: 40 INJECTION, POWDER, FOR SOLUTION INTRAMUSCULAR; INTRAVENOUS at 08:34

## 2017-01-01 RX ADMIN — Medication 10 ML: at 13:35

## 2017-01-01 RX ADMIN — PIPERACILLIN SODIUM,TAZOBACTAM SODIUM 3.38 G: 3; .375 INJECTION, POWDER, FOR SOLUTION INTRAVENOUS at 17:38

## 2017-01-01 RX ADMIN — Medication 10 ML: at 20:21

## 2017-01-01 RX ADMIN — SODIUM CHLORIDE 1000 ML: 900 INJECTION, SOLUTION INTRAVENOUS at 08:34

## 2017-01-01 RX ADMIN — PIPERACILLIN SODIUM,TAZOBACTAM SODIUM 3.38 G: 3; .375 INJECTION, POWDER, FOR SOLUTION INTRAVENOUS at 17:03

## 2017-01-01 RX ADMIN — MUPIROCIN: 20 OINTMENT TOPICAL at 21:04

## 2017-01-01 RX ADMIN — PIPERACILLIN SODIUM,TAZOBACTAM SODIUM 3.38 G: 3; .375 INJECTION, POWDER, FOR SOLUTION INTRAVENOUS at 10:16

## 2017-01-01 RX ADMIN — SODIUM CHLORIDE 500 ML: 900 INJECTION, SOLUTION INTRAVENOUS at 06:46

## 2017-01-01 RX ADMIN — PHENYLEPHRINE HYDROCHLORIDE 100 MCG/MIN: 10 INJECTION INTRAVENOUS at 13:16

## 2017-01-01 RX ADMIN — PIPERACILLIN SODIUM,TAZOBACTAM SODIUM 3.38 G: 3; .375 INJECTION, POWDER, FOR SOLUTION INTRAVENOUS at 09:45

## 2017-01-01 RX ADMIN — Medication 10 ML: at 05:48

## 2017-01-01 RX ADMIN — DEXTROSE MONOHYDRATE AND SODIUM CHLORIDE 50 ML/HR: 5; .9 INJECTION, SOLUTION INTRAVENOUS at 04:07

## 2017-01-01 RX ADMIN — MUPIROCIN: 20 OINTMENT TOPICAL at 08:20

## 2017-01-01 RX ADMIN — LACTULOSE 30 G: 10 SOLUTION ORAL at 17:37

## 2017-01-01 RX ADMIN — Medication 10 ML: at 05:17

## 2017-01-01 RX ADMIN — SODIUM CHLORIDE 40 MG: 9 INJECTION INTRAMUSCULAR; INTRAVENOUS; SUBCUTANEOUS at 20:21

## 2017-01-01 RX ADMIN — DEXTROSE MONOHYDRATE 12.5 G: 25 INJECTION, SOLUTION INTRAVENOUS at 00:35

## 2017-01-01 RX ADMIN — HYDROCORTISONE SODIUM SUCCINATE 50 MG: 100 INJECTION, POWDER, FOR SOLUTION INTRAMUSCULAR; INTRAVENOUS at 13:35

## 2017-01-01 RX ADMIN — DEXTROSE MONOHYDRATE 12.5 G: 25 INJECTION, SOLUTION INTRAVENOUS at 05:17

## 2017-01-01 RX ADMIN — METHYLPREDNISOLONE SODIUM SUCCINATE 20 MG: 40 INJECTION, POWDER, FOR SOLUTION INTRAMUSCULAR; INTRAVENOUS at 20:00

## 2017-07-10 PROBLEM — K76.82 HEPATIC ENCEPHALOPATHY: Status: ACTIVE | Noted: 2017-01-01

## 2017-07-10 NOTE — Clinical Note
Status[de-identified] Inpatient [101] Type of Bed: Stepdown [17] Inpatient Hospitalization Certified Necessary for the Following Reasons: 9. Other (further clarification in H&P documentation) Admitting Diagnosis: [facility] [572. 2. ICD-9-CM] Admitting Physician: Yeyo Alcocer, [facility] Attending Physician: Raquel Rainey [[id number]] Estimated Length of Stay: 2 Midnights Discharge Plan[de-identified] Home with Office Follow-up

## 2017-07-10 NOTE — H&P
Hospitalist Admission Note    NAME: Kayleen Starr   :  1960   MRN:  [id number]     Date/Time:  7/10/2017 4:38 PM    Patient PCP: Madison Dodge MD  ________________________________________________________________________    Given the patient's current clinical presentation, I have a high level of concern for decompensation if discharged from the ED. Complex decision making was performed which includes reviewing the patient's available past medical records, laboratory results, and Xray films. I have also directly communicated my plan and discussed this case with the involved ED physician. My assessment of this patient's clinical condition and my plan of care is as follows:    Assessment / Plan:  Hepatic encephalopathy  ETOH abuse with alcoholic cirrhosis  Hyperbilirubinemia possible obstructive  pancolitis  Pancreatic abnormality cannot determine mass vs pseudocyst  Anemia, chronic illness  Hyponatremia, acute  Hypokalemia, acute  Acute renal failure with dehydration  Elevated liver function testing  -lactulose   -follow ammonia in AM and follow clinically for clearing  -GI to see re abnormality in liver and pancreas as well as insight on colitis for which I have started zosyn  -GI to see to determine if ERCP needed - will get MRCP re the elevated bilirubin and ensure no evidence of obstruction from mass  -IVF NS - watch sodium to ensure not worsened implying SIADH, trend creat - if not improved then consider hepatorenal as etiology  -high risk for dt's - will initiate CIWA  -trend LFT  -no obvious ascites to tap  -fall risk precautions  -aspiration precautions  -need to address code status with patient if he should improve to cognizant self post lactulose  -hepatitis panel pending      I have personally reviewed the radiographs, laboratory data in Epic and decisions and statements above are based partially on this personal interpretation.     Code Status: Full Code  DVT Prophylaxis: Hep SQ  GI Prophylaxis: PPI          Subjective:   CHIEF COMPLAINT: \"I am fine\"    HISTORY OF PRESENT ILLNESS:     Dulce Powers is a 55 y.o.  male with known history as listed below presents to ED with complaint noted above. Available records were reviewed at the time of H&P. Patient with known hx of ETOH abuse and pancreatitis with hepatic encephalopathy and GERD with possible pancreatic mass in 2012 p/w jaundice. Family notes worsened mentation this week as well and intermittent moaning and grabbing of left lower belly. No fevers. Has continued to consume 80oz of beer daily until this week when he was too weak to drink all of the beer. Very poor PO intake of foods. Family notes weakness of voice and body, ++weight loss as well but they could not quantitate the amount but believe it is accelerating over this last month. In ED noted on CT abd to have diffuse colitis and pancreatic abnormality and abnormal liver architecture. He was confused with elevated ammonia. We were asked to admit for work up and evaluation of the above problems.      Past Medical History:   Diagnosis Date    Alcohol abuse     GERD (gastroesophageal reflux disease)     occasionally only; takes OTC meds prn    Hypertension     no TX now    Other ill-defined conditions     pancreatitis hx; resolved      Past Surgical History:   Procedure Laterality Date    HX OTHER SURGICAL      cataract left     ID COLONOSCOPY FLX DX W/COLLJ SPEC WHEN PFRMD  3/7/2013          Social History   Substance Use Topics    Smoking status: Current Every Day Smoker     Packs/day: 1.00    Smokeless tobacco: Not on file    Alcohol use Yes      Comment: report \"2 quarts a day\"      Family History   Problem Relation Age of Onset    Hypertension Other         No Known Allergies     Prior to Admission medications    Not on File     REVIEW OF SYSTEMS:  See HPI for details  Patient was not able to provide review of systems due to mental status change/acute illness    Objective: VITALS:    Visit Vitals    /64    Pulse 89    Temp 97.5 °F (36.4 °C)    Ht 5' 10\" (1.778 m)    Wt 74.8 kg (165 lb)    SpO2 100%    BMI 23.68 kg/m2     PHYSICAL EXAM:     GENERAL:    WD y   WN    Cachectic    Thin y   Obese    Disheveled y   Ill Appearing Critically n   Ill Appearing Chronically y   Acute Distress y   Other      HEENT:    NC/AT/EOMI y   PERRLA y   Conjunctivae Pink    Conjunctivae Pale y   Moist Mucosa    Dry Mucosa y   Hearing intact to voice y   Other Jaundiced, icteric sclerae     NECK:    Supple y   Masses n   Thyroid Tender n   Other                   RESPIRATORY:    CTA bilaterally WITHOUT wheezing/rhonchi/rales or crackles y   Wheezing    Rhonchi    Crackles    Use of accessory muscles n   Other      CARDIAC:    regular rate and rhythm No murmurs/rubs/gallops y   Murmur    Rubs    Gallops    Rate Regular/Irregular reg   Carotid Bruit Left/Right n   Lower Extremity Edema 1+   JVP  n   Other Normal capillary refill     ABDOMEN:    Soft non distended non tender +bowel sounds no HSM    Rigid n   Tenderness y llq   Hepatomegaly n   Splenomegaly n   Distended y   Increased girth due to habitus    Normal/Hyper/Hypo Active Bowel Sounds hypo   Other      SKIN / MUSCULOSKELETAL:    Rashes n   Ecchymosis n   Ulcers    Tight to palpitation    Turgor Good/Poor poor   Cyanosis/Clubbing n   Amputation(s)    Other Mild spider angiomata     NEUROLOGY:    cranial nerves II-XII grossly intact y   Cranial Nerve Deficit    Facial Droop n   Slurred Speech    Aphasia    Strength Normal    Weakness y global   Meningismus/Kernig's Sign/ Brudzinsky n   Follows Commands y   Other      PSYCHIATRIC:    AAOx3 in no acute distress    Insight Poor y   Insight Good    Alert and Oriented to Person  y   Alert and Oriented to Place n   Alert and Oriented toTime n   Depressed    Anxious n   Agitated n   Lethargic y mild   Stuporous    Sedated    Other _______________________________________________________________________  Care Plan discussed with:    Comments   Patient x Discussed with patient in room. POC outlined and Questions answered (25   Family  x Brother, neighbor   RN x    Care Manager                    Consultant:  zack GUSMAN MD 5   _______________________________________________________________________  Recommended Disposition:   Home with Family y   HH/PT/OT/RN y   SNF/LTC y   [de-identified]    ________________________________________________________________________  TOTAL TIME:  54 Minutes    Critical Care Provided     Minutes non procedure based      Comments   >50% of visit spent in counseling and coordination of care x Chart review  Discussion with patient and/or family and questions answered     ________________________________________________________________________  Signed: Rosalba Gatica MD    This note will not be viewable in [address] Procedures: see electronic medical records for all procedures/Xrays and details which were not copied into this note but were reviewed prior to creation of Plan.     LAB DATA REVIEWED:    Recent Results (from the past 24 hour(s))   EKG, 12 LEAD, INITIAL    Collection Time: 07/10/17 11:43 AM   Result Value Ref Range    Ventricular Rate 77 BPM    Atrial Rate 77 BPM    P-R Interval 146 ms    QRS Duration 66 ms    Q-T Interval 400 ms    QTC Calculation (Bezet) 452 ms    Calculated P Axis 45 degrees    Calculated R Axis 52 degrees    Calculated T Axis 28 degrees    Diagnosis       Normal sinus rhythm  Nonspecific T wave abnormality  When compared with ECG of 30-APR-2016 12:36,  premature ventricular complexes are no longer present  Nonspecific T wave abnormality now evident in Anterior leads  Confirmed by Nikole Carter ([id number]) on 7/10/2017 3:17:50 PM     CBC WITH AUTOMATED DIFF    Collection Time: 07/10/17 12:05 PM   Result Value Ref Range    WBC 12.2 (H) 4.1 - 11.1 K/uL    RBC 3.04 (L) 4.10 - 5.70 M/uL    HGB 10.4 (L) 12.1 - 17.0 g/dL    HCT 28.7 (L) 36.6 - 50.3 %    MCV 94.4 80.0 - 99.0 FL    MCH 34.2 (H) 26.0 - 34.0 PG    MCHC 36.2 30.0 - 36.5 g/dL    RDW 21.7 (H) 11.5 - 14.5 %    PLATELET 819 062 - 902 K/uL    NEUTROPHILS 76 (H) 32 - 75 %    LYMPHOCYTES 13 12 - 49 %    MONOCYTES 11 5 - 13 %    EOSINOPHILS 0 0 - 7 %    BASOPHILS 0 0 - 1 %    ABS. NEUTROPHILS 9.3 (H) 1.8 - 8.0 K/UL    ABS. LYMPHOCYTES 1.6 0.8 - 3.5 K/UL    ABS. MONOCYTES 1.3 (H) 0.0 - 1.0 K/UL    ABS. EOSINOPHILS 0.0 0.0 - 0.4 K/UL    ABS.  BASOPHILS 0.0 0.0 - 0.1 K/UL    RBC COMMENTS ANISOCYTOSIS  2+       URINALYSIS W/MICROSCOPIC    Collection Time: 07/10/17  1:52 PM   Result Value Ref Range    Color DEBRA      Appearance CLOUDY (A) CLEAR      Specific gravity 1.011 1.003 - 1.030      pH (UA) 6.5 5.0 - 8.0      Protein NEGATIVE  NEG mg/dL    Glucose NEGATIVE  NEG mg/dL    Ketone 15 (A) NEG mg/dL    Blood NEGATIVE  NEG      Urobilinogen 2.0 (H) 0.2 - 1.0 EU/dL    Nitrites NEGATIVE  NEG      Leukocyte Esterase NEGATIVE  NEG      WBC 0-4 0 - 4 /hpf    RBC 0-5 0 - 5 /hpf    Epithelial cells FEW FEW /lpf    Bacteria NEGATIVE  NEG /hpf   BILIRUBIN, CONFIRM    Collection Time: 07/10/17  1:52 PM   Result Value Ref Range    Bilirubin UA, confirm POSITIVE (A) NEG     CK W/ CKMB & INDEX    Collection Time: 07/10/17  2:57 PM   Result Value Ref Range    CK 62 39 - 308 U/L    CK - MB 2.5 <3.6 NG/ML    CK-MB Index 4.0 (H) 0 - 2.5     AMMONIA    Collection Time: 07/10/17  2:57 PM   Result Value Ref Range    Ammonia 93 (H) <43 UMOL/L   METABOLIC PANEL, COMPREHENSIVE    Collection Time: 07/10/17  2:57 PM   Result Value Ref Range    Sodium 129 (L) 136 - 145 mmol/L    Potassium 2.7 (LL) 3.5 - 5.1 mmol/L    Chloride 95 (L) 97 - 108 mmol/L    CO2 24 21 - 32 mmol/L    Anion gap 10 5 - 15 mmol/L    Glucose 83 65 - 100 mg/dL    BUN 15 6 - 20 MG/DL    Creatinine 1.69 (H) 0.70 - 1.30 MG/DL    BUN/Creatinine ratio 9 (L) 12 - 20      GFR est AA 51 (L) >60 ml/min/1.73m2    GFR est non-AA 42 (L) >60 ml/min/1.73m2    Calcium 7.6 (L) 8.5 - 10.1 MG/DL    Bilirubin, total 16.9 (H) 0.2 - 1.0 MG/DL    ALT (SGPT) 33 12 - 78 U/L    AST (SGOT) 115 (H) 15 - 37 U/L    Alk.  phosphatase 131 (H) 45 - 117 U/L    Protein, total 7.2 6.4 - 8.2 g/dL    Albumin 1.1 (L) 3.5 - 5.0 g/dL    Globulin 6.1 (H) 2.0 - 4.0 g/dL    A-G Ratio 0.2 (L) 1.1 - 2.2     TROPONIN I    Collection Time: 07/10/17  2:57 PM   Result Value Ref Range    Troponin-I, Qt. <0.04 <0.05 ng/mL   LIPASE    Collection Time: 07/10/17  2:57 PM   Result Value Ref Range    Lipase 129 73 - 393 U/L   MAGNESIUM    Collection Time: 07/10/17  2:57 PM   Result Value Ref Range    Magnesium 2.2 1.6 - 2.4 mg/dL   PROTHROMBIN TIME + INR    Collection Time: 07/10/17  2:57 PM   Result Value Ref Range    INR 3.5 (H) 0.9 - 1.1      Prothrombin time 36.4 (H) 9.0 - 11.1 sec   PTT    Collection Time: 07/10/17  2:57 PM   Result Value Ref Range    aPTT 81.2 (H) 22.1 - 32.5 sec    aPTT, therapeutic range     58.0 - 77.0 SECS

## 2017-07-10 NOTE — ED PROVIDER NOTES
HPI Comments: Fletcher Moore is a 55 y.o. male, with PMHx significant for EtOH abuse, HTN, GERD, cirrhosis of the liver, and pancreatitis, who presents via EMS to the ED with cc of generalized weakness for several days. Per EMS the Pt's brother reported that the pt had an overall yellow complexion for several weeks and notified the patient to go to the ED. Currently, pt denies having any pain. Pt states he is a daily alcohol user, stating he drinks ~1 quart per day of alcohol (unspecified what exact beverage of choice). Pt reports to have additional intermittent symptoms of diarrhea and CP. Pt specifically denies nausea, vomiting, and abdominal pain. Pt denies any h/o liver failure. PSHx of colonoscopy    Social hx: + Tobacco use (0.5 pack/day), + EtOH use (1 qt/day), - Illicit drug use    PCP: Madison Dodge MD    There are no other complaints, changes or physical findings at this time. The history is provided by the patient and the EMS personnel. No  was used. Past Medical History:   Diagnosis Date    Alcohol abuse     GERD (gastroesophageal reflux disease)     occasionally only; takes OTC meds prn    Hypertension     no TX now    Other ill-defined conditions(799.89)     pancreatitis hx; resolved       Past Surgical History:   Procedure Laterality Date    HX OTHER SURGICAL      cataract left     [place] COLONOSCOPY FLX DX W/COLLJ SPEC WHEN PFRMD  3/7/2013              No family history on file. Social History     Social History    Marital status: SINGLE     Spouse name: N/A    Number of children: N/A    Years of education: N/A     Occupational History    Not on file.      Social History Main Topics    Smoking status: Current Every Day Smoker     Packs/day: 1.00    Smokeless tobacco: Not on file    Alcohol use Yes      Comment: report \"2 quarts a day\"    Drug use: No    Sexual activity: Not on file     Other Topics Concern    Not on file     Social History Narrative ALLERGIES: Review of patient's allergies indicates no known allergies. Review of Systems   Constitutional: Negative for appetite change, chills, fatigue and fever. HENT: Negative. Negative for congestion, rhinorrhea, sinus pressure and sore throat. Eyes: Negative. Respiratory: Negative. Negative for cough, choking, chest tightness, shortness of breath and wheezing. Cardiovascular: Positive for chest pain. Negative for palpitations and leg swelling. Gastrointestinal: Positive for diarrhea. Negative for abdominal pain, constipation, nausea and vomiting. Endocrine: Negative. Genitourinary: Negative. Negative for difficulty urinating, dysuria, flank pain and urgency. Musculoskeletal: Negative. Skin: Positive for color change (jaundice). Neurological: Positive for weakness (generalized). Negative for dizziness, speech difficulty, light-headedness, numbness and headaches. Psychiatric/Behavioral: Negative. All other systems reviewed and are negative. Vitals:    07/10/17 1100 07/10/17 1133   BP: 107/64 107/64   Pulse: 89    Temp: 97.5 °F (36.4 °C)    SpO2: 99% 100%   Weight: 74.8 kg (165 lb)    Height: 5' 10\" (1.778 m)             Physical Exam   Constitutional: He appears well-developed and well-nourished. No distress. Thin  male, no acute distress     HENT:   Head: Normocephalic and atraumatic. Mouth/Throat: Oropharynx is clear and moist. No oropharyngeal exudate. Eyes: Conjunctivae and EOM are normal. Pupils are equal, round, and reactive to light. Scleral icterus (Bilateral ) is present. Neck: Normal range of motion. Neck supple. No JVD present. No tracheal deviation present. Cardiovascular: Normal rate, regular rhythm, normal heart sounds and intact distal pulses. No murmur heard. Pulmonary/Chest: Effort normal and breath sounds normal. No stridor. No respiratory distress. He has no wheezes. He has no rales. He exhibits no tenderness. Abdominal: Soft. He exhibits distension. There is no tenderness. There is no rebound and no guarding. Musculoskeletal: Normal range of motion. He exhibits no edema or tenderness. Neurological: He is alert. No cranial nerve deficit. No focal motor or sensory deficits, awake, alert, confused    Skin: Skin is warm and dry. He is not diaphoretic. Jaundiced   Psychiatric: His behavior is normal.   Flat affect   Nursing note and vitals reviewed. MDM  Number of Diagnoses or Management Options  Alcohol dependence with other alcohol-induced disorder [facility]):   Hepatic encephalopathy [facility]):   Pancreatic mass:   Diagnosis management comments: DDx: acute hepatic failure, pancreatitis, bowel duct obstruction, electrolyte abnormality       Amount and/or Complexity of Data Reviewed  Clinical lab tests: ordered and reviewed  Tests in the radiology section of CPT®: ordered and reviewed  Tests in the medicine section of CPT®: ordered and reviewed  Review and summarize past medical records: yes  Discuss the patient with other providers: yes (Hospitalist)  Independent visualization of images, tracings, or specimens: yes    Critical Care  Total time providing critical care: 30-74 minutes    Patient Progress  Patient progress: stable    CRITICAL CARE NOTE:  IMPENDING DETERIORATION -CNS, Metabolic and Hepatic  ASSOCIATED RISK FACTORS - Metabolic changes and CNS Decompensation  MANAGEMENT- Bedside Assessment and Supervision of Care  INTERPRETATION -  Xrays, ECG, Blood Pressure and Cardiac Output Measures   INTERVENTIONS - Neurologic interventions  and Metobolic interventions  CASE REVIEW - Hospitalist and Nursing  TREATMENT RESPONSE -Unchanged   PERFORMED BY - Self  NOTES:  I have spent 40 minutes of critical care time involved in lab review, consultations with specialist, family decision- making, bedside attention and documentation. During this entire length of time I was immediately available to the patient.   Dav Li. Marylene Minder, DO. Procedures     EKG- NSR, rate 77, normal axis, normal pr/qrs, NSST. Anaid Moscoso DO    Procedure Note- Arterial Stick  2:59 PM  Performed by: DO Luis Laguerre DO gained arterial access using an arterial stick to draw labs. After cleaning the site with alcohol prep, the Right brachial artery was localized with palpation and good blood return. No anaesthetic was used. The line was successfully flushed with normal saline and removed following collection of blood to be sent to the lab for analysis. Estimated blood loss: Minimal  The procedure took 1-15 minutes, and pt tolerated well. CONSULT NOTE:   5:49 PM  Luis Watson DO spoke with Dr. Bharathi Padron,   Specialty: Hospitalist  Discussed pt's hx, disposition, and available diagnostic and imaging results. Reviewed care plans. Consultant will evaluate pt for admission.     LABORATORY TESTS:  Recent Results (from the past 12 hour(s))   EKG, 12 LEAD, INITIAL    Collection Time: 07/10/17 11:43 AM   Result Value Ref Range    Ventricular Rate 77 BPM    Atrial Rate 77 BPM    P-R Interval 146 ms    QRS Duration 66 ms    Q-T Interval 400 ms    QTC Calculation (Bezet) 452 ms    Calculated P Axis 45 degrees    Calculated R Axis 52 degrees    Calculated T Axis 28 degrees    Diagnosis       Normal sinus rhythm  Nonspecific T wave abnormality  When compared with ECG of 30-APR-2016 12:36,  premature ventricular complexes are no longer present  Nonspecific T wave abnormality now evident in Anterior leads  Confirmed by Radha Key ([id number]) on 7/10/2017 3:17:50 PM     CBC WITH AUTOMATED DIFF    Collection Time: 07/10/17 12:05 PM   Result Value Ref Range    WBC 12.2 (H) 4.1 - 11.1 K/uL    RBC 3.04 (L) 4.10 - 5.70 M/uL    HGB 10.4 (L) 12.1 - 17.0 g/dL    HCT 28.7 (L) 36.6 - 50.3 %    MCV 94.4 80.0 - 99.0 FL    MCH 34.2 (H) 26.0 - 34.0 PG    MCHC 36.2 30.0 - 36.5 g/dL    RDW 21.7 (H) 11.5 - 14.5 %    PLATELET 028 599 - 747 K/uL    NEUTROPHILS 76 (H) 32 - 75 %    LYMPHOCYTES 13 12 - 49 %    MONOCYTES 11 5 - 13 %    EOSINOPHILS 0 0 - 7 %    BASOPHILS 0 0 - 1 %    ABS. NEUTROPHILS 9.3 (H) 1.8 - 8.0 K/UL    ABS. LYMPHOCYTES 1.6 0.8 - 3.5 K/UL    ABS. MONOCYTES 1.3 (H) 0.0 - 1.0 K/UL    ABS. EOSINOPHILS 0.0 0.0 - 0.4 K/UL    ABS. BASOPHILS 0.0 0.0 - 0.1 K/UL    RBC COMMENTS ANISOCYTOSIS  2+       URINALYSIS W/MICROSCOPIC    Collection Time: 07/10/17  1:52 PM   Result Value Ref Range    Color DEBRA      Appearance CLOUDY (A) CLEAR      Specific gravity 1.011 1.003 - 1.030      pH (UA) 6.5 5.0 - 8.0      Protein NEGATIVE  NEG mg/dL    Glucose NEGATIVE  NEG mg/dL    Ketone 15 (A) NEG mg/dL    Blood NEGATIVE  NEG      Urobilinogen 2.0 (H) 0.2 - 1.0 EU/dL    Nitrites NEGATIVE  NEG      Leukocyte Esterase NEGATIVE  NEG      WBC 0-4 0 - 4 /hpf    RBC 0-5 0 - 5 /hpf    Epithelial cells FEW FEW /lpf    Bacteria NEGATIVE  NEG /hpf   BILIRUBIN, CONFIRM    Collection Time: 07/10/17  1:52 PM   Result Value Ref Range    Bilirubin UA, confirm POSITIVE (A) NEG     AMMONIA    Collection Time: 07/10/17  2:57 PM   Result Value Ref Range    Ammonia 93 (H) <05 UMOL/L   METABOLIC PANEL, COMPREHENSIVE    Collection Time: 07/10/17  2:57 PM   Result Value Ref Range    Sodium 129 (L) 136 - 145 mmol/L    Potassium 2.7 (LL) 3.5 - 5.1 mmol/L    Chloride 95 (L) 97 - 108 mmol/L    CO2 24 21 - 32 mmol/L    Anion gap 10 5 - 15 mmol/L    Glucose 83 65 - 100 mg/dL    BUN 15 6 - 20 MG/DL    Creatinine 1.69 (H) 0.70 - 1.30 MG/DL    BUN/Creatinine ratio 9 (L) 12 - 20      GFR est AA 51 (L) >60 ml/min/1.73m2    GFR est non-AA 42 (L) >60 ml/min/1.73m2    Calcium 7.6 (L) 8.5 - 10.1 MG/DL    Bilirubin, total 16.9 (H) 0.2 - 1.0 MG/DL    ALT (SGPT) 33 12 - 78 U/L    AST (SGOT) 115 (H) 15 - 37 U/L    Alk.  phosphatase 131 (H) 45 - 117 U/L    Protein, total 7.2 6.4 - 8.2 g/dL    Albumin 1.1 (L) 3.5 - 5.0 g/dL    Globulin 6.1 (H) 2.0 - 4.0 g/dL    A-G Ratio 0.2 (L) 1.1 - 2.2     TROPONIN I    Collection Time: 07/10/17  2:57 PM   Result Value Ref Range    Troponin-I, Qt. <0.04 <0.05 ng/mL   LIPASE    Collection Time: 07/10/17  2:57 PM   Result Value Ref Range    Lipase 129 73 - 393 U/L   MAGNESIUM    Collection Time: 07/10/17  2:57 PM   Result Value Ref Range    Magnesium 2.2 1.6 - 2.4 mg/dL   PROTHROMBIN TIME + INR    Collection Time: 07/10/17  2:57 PM   Result Value Ref Range    INR 3.5 (H) 0.9 - 1.1      Prothrombin time 36.4 (H) 9.0 - 11.1 sec   PTT    Collection Time: 07/10/17  2:57 PM   Result Value Ref Range    aPTT 81.2 (H) 22.1 - 32.5 sec    aPTT, therapeutic range     58.0 - 77.0 SECS       IMAGING RESULTS:  US ABD LTD   Final Result   INDICATION:  Jaundice evaluation      EXAMINATION:  ULTRASOUND ABDOMEN, limited     COMPARISON:  CT abdomen 12/28/2012     FINDINGS:      Routine ultrasound images of the abdomen were obtained.      The liver is nodular in contour with heterogeneously increased echotexture. No  evidence for focal abnormality. The common bile duct is normal measuring 2 mm in  diameter. The gallbladder contains sludge and a sludge ball versus nonshadowing  stone. The main portal vein is patent with hepatopedal flow.     The pancreas head is unremarkable. The remainder is obscured by bowel gas.      The right kidney measures 11.2 cm in length. No focal lesion or hydronephrosis.     Small amount of ascites     IMPRESSION  IMPRESSION:      Nodular contour of the liver may be due to cirrhosis. Small amount of ascites. Diffusely increased heterogeneous echotexture of the liver. No evidence for  biliary ductal dilatation     Sludge and a sludge ball versus nonshadowing stone in the gallbladder. The  gallbladder is otherwise unremarkable in appearance. CT Results  (Last 48 hours)               07/10/17 1637  CT ABD PELV W CONT Final result    Impression:   impression: Very abnormal liver which should be further evaluated. Ascites. Possible pancreatic head mass. Diffuse colitis.        Narrative: EXAM:  CT ABD PELV W CONT       INDICATION: PRIOR PANCREATIC MASS       COMPARISON: 2012       CONTRAST:  100 mL of Isovue-370. TECHNIQUE:    Following the uneventful intravenous administration of 100 cc Isovue-370, thin   axial images were obtained through the abdomen and pelvis. Coronal and sagittal   reconstructions were generated. Oral contrast was not administered. CT dose   reduction was achieved through use of a standardized protocol tailored for this   examination and automatic exposure control for dose modulation. FINDINGS:    There is a mild amount of ascites. There is diffuse nodularity and   nonhomogeneous appearance to the liver worsened since the prior examination. This is compatible with cirrhosis, additional masses cannot be excluded by this   examination only, nonemergent MRI with special attention to that area would be   of value. The spleen is normal in size. The lung bases show some chronic   interstitial prominence. It is a hiatal hernia. The pancreas does not show   significant duct dilatation, there is however diffuse decreased density in the   head and uncinate process. The kidneys are unobstructed. The colon show diffuse   bowel wall thickening suggestive of colitis. There is some mild small bowel   distention also with wall thickening but no definite suggestion of obstruction. The bladder is midline. The prostate is enlarged with minimal calcifications. Major vessels appear patent.                    MEDICATIONS GIVEN:  Medications   sodium chloride (NS) flush 5-10 mL (not administered)   sodium chloride (NS) flush 5-10 mL (not administered)   lactulose (CHRONULAC) solution 30 g (not administered)   piperacillin-tazobactam (ZOSYN) 3.375 g in 0.9% sodium chloride (MBP/ADV) 100 mL (not administered)   potassium chloride SR (KLOR-CON 10) tablet 40 mEq (not administered)   0.9% sodium chloride infusion (50 mL/hr IntraVENous New Bag 7/10/17 1614)   iopamidol (ISOVUE-370) 76 % injection 100 mL (100 mL IntraVENous Given 7/10/17 1614)   sodium chloride (NS) flush 10 mL (10 mL IntraVENous Given 7/10/17 1614)       IMPRESSION:  1. Hepatic encephalopathy ([facility] [address]    2. Pancreatic mass    3. Alcohol dependence with other alcohol-induced disorder ([facility] [address]        PLAN:  1. Admit to Hospitalist    ADMIT NOTE:  4:24 PM  Patient is being admitted to the hospital by Dr. Anuj Valadez. The results of their tests and reasons for their admission have been discussed with them and/or available family. They convey agreement and understanding for the need to be admitted and for their admission diagnosis. Consultation has been made with the inpatient physician specialist for hospitalization. ATTESTATION:  This note is prepared by Raymundo Martinez and Mark Rivero, acting as Scribe for [facility], [address] [place] [address], DO: The scribe's documentation has been prepared under my direction and personally reviewed by me in its entirety. I confirm that the note above accurately reflects all work, treatment, procedures, and medical decision making performed by me.

## 2017-07-11 NOTE — ED NOTES
Patient out of bed at this time to bedside commode. Sitter to be at bedside at this point to monitor patient safety.

## 2017-07-11 NOTE — PROGRESS NOTES
Physical Therapy    Received PT eval order but the order had been completed already. Will need new order for PT eval. Informed nurse. Pt also not appropriate for PT at this time as his temp is 92.6 and he is not following commands. Will see for eval when appropriate and new order obtained.     Jesica Herman, PT

## 2017-07-11 NOTE — ED NOTES
TRANSFER - OUT REPORT:    Verbal report given to (name) on Flora Meigs  being transferred to [facility] (unit) for routine progression of care       Report consisted of patients Situation, Background, Assessment and   Recommendations(SBAR). Information from the following report(s) SBAR and Kardex was reviewed with the receiving nurse. Lines:   Peripheral IV 07/11/17 Left Arm (Active)   Site Assessment Clean, dry, & intact 7/11/2017  7:14 AM   Phlebitis Assessment 0 7/11/2017  7:14 AM   Dressing Status Clean, dry, & intact 7/11/2017  7:14 AM   Dressing Type Transparent 7/11/2017  7:14 AM   Hub Color/Line Status Pink 7/11/2017  7:14 AM        Opportunity for questions and clarification was provided.       Patient transported with:   Monitor  Registered Nurse  Tech

## 2017-07-11 NOTE — ED NOTES
Pt climbed out of bed and removed peripheral IV. Pt with diarrhea. Pt instructed on use of call bell and the importance of calling for assistance. Pt demonstrated understanding by teach back method. Pt remains in view of nurse's station. Pt placed on bedpan, will call back when he needs to be removed.

## 2017-07-11 NOTE — PROGRESS NOTES
MRI    Attempted to send for pt X2 today    Was told nurse would call to see if pt was able to have MRI today since he   Climbed out the scanner last night    No return phone call to inform the pt  Pt on hold

## 2017-07-11 NOTE — ED NOTES
Dr Trina Tolbert at bedside. Provider notifies that patient meets sepsis protocol, only new ordered received were 1000 cc bolus NS, and blood cultures, Will continue to monitor.

## 2017-07-11 NOTE — ED NOTES
Spoke with Dr Zavala Courser she states that she want to keep patient PCU at this time and not send patient to ICU. Dr Lucas Fitzpatrick notified of patients recent vitals.

## 2017-07-11 NOTE — ED NOTES
Dr Gail Che notified and aware of previous RN seeing blood in stool. Pt recent bowel movement appears to have no blood in stool. BM liquid.

## 2017-07-11 NOTE — PROGRESS NOTES
Pharmacy Automatic Renal Dosing Protocol - Antimicrobials/Vancomycin    Indication for Antimicrobials: Intra-abdominal Infection  Current Regimen of Each Antimicrobial (Start Day & Day of Therapy):  Zosyn 3.375g IV q8h (start 7/10 Day 2)  Vancomycin IV (; Day #1)    Vancomycin trough goal: 15-20 mcg/mL    Significant Cultures: none current  CAPD, Hemodialysis or Renal Replacement Therapy: none   Paralysis, amputations, malnutrition: none  Recent Labs      17   0313  07/10/17   1457  07/10/17   1205   CREA  1.87*  1.69*   --    BUN  17  15   --    WBC  12.9*   --   12.2*     Temp (24hrs), Av.4 °F (34.1 °C), Min:92.6 °F (33.7 °C), Max:94.6 °F (34.8 °C)    Creatinine Clearance (Creatinine Clearance (ml/min)): 46     Impression/Plan:    Zosyn dosed appropriately for renal fxn - extended dosing interval   IV Vancomycin therapy initiated as a 2000 mg x 1 loading dose (~27 mg/kg), followed by maintenance dosing \"per levels\" as patient is currently in [place]IL[place]   A random vancomycin level has been ordered to be collected with 12 am labs   Daily BMP ordered starting  as CMP ordered for        Pharmacy will follow daily and adjust medications as appropriate for renal function and/or serum levels.     Thank you,  Kevin Singh PHARMD     Renal Dosing Tables on [employer]

## 2017-07-11 NOTE — ED NOTES
Pt. Difficult to follow commands, continue to redirect pt. Pt. Unable to keep arm straight in order for fluids to go in. Pt. Continually bending arm despite education and redirection. Skin site of IV reddened. IV flushes fine. No complaints of pain.

## 2017-07-11 NOTE — ED NOTES
Attempted to notify Dr. Sarah Freeman that patient did not tolerate MRI scan. Unable to get ahold of him at this time. Will call back.

## 2017-07-11 NOTE — PROGRESS NOTES
Pt have lower GI bleeding 2 episodes of bowel movement with blood. Will add IV PPIs and montior H&H. Seems GI to be consultant in couple of hours from now.  Stop SQ heparin

## 2017-07-11 NOTE — ED NOTES
Bedside shift change report given to Sidney Trujillo RN (oncoming nurse) by Gala Troy RN (offgoing nurse). Report included the following information SBAR.

## 2017-07-11 NOTE — CONSULTS
GI Consultation Note Neftali Shimon)    NAME: Therese Rogers : 1960 MRN: [id number]   ATTG: Beatriz Singer MD PCP: Tati Cho MD  Date/Time:  2017 12:24 PM  Subjective:   REASON FOR CONSULT:      Digna Hurtado is a 55 y.o.  male with EtOH induced cirrhosis and pancreatitis, engaged in ongoing EtOH abuse of 1 qt EtOH/d, who I was asked to see for evaluation of hepatic encephalopathy,  He presented to the ER yesterday at 230pm accompanied by his brother, c/o a few days hx generalized weakness and jaundice with yellow eyes present for the laast few weeks. The patient denies abdominal pain, N/V, hematemesis, dysphagia, pain with PO intake, WL, F/C, or D/C. However, family reports that he was having abdominal pain, decreased PO intake, and WL   He is reported to have liquid bloody stool at time s in ER and then normal BMs. He has not decreased his EtOH intake during his period of malaise as OP and is reported to have had seizures in the past but it is unclear if it was related to EtOH cessation. ER eval here noted T.Bili 16.9, Alb 1.1, , ALT 33, Cr 1.69, INR 3.5, PT 36.4, WBC 12.2, H/H 10.4/28.7, MCV 94.4, plt 172, NH3 93.  Imaging noted diffuse colitis and pancreatic abnormality and abnormal liver architecture. He was reported as confused with elevated ammonia. The previous abnl appearance of the pancreatic head is noted to persist.  No ductal dilatation of biliary tree is demonstrated. He is a poor historian here in ER and has been reported to try to leave his room repeatedly.        Past Medical History:   Diagnosis Date    Alcohol abuse     GERD (gastroesophageal reflux disease)     occasionally only; takes OTC meds prn    Hypertension     no TX now    Other ill-defined conditions     pancreatitis hx; resolved      Past Surgical History:   Procedure Laterality Date    HX OTHER SURGICAL      cataract left     NV COLONOSCOPY FLX DX W/COLLJ SPEC WHEN PFRMD  3/7/2013          Social History   Substance Use Topics    Smoking status: Current Every Day Smoker     Packs/day: 1.00    Smokeless tobacco: Not on file    Alcohol use Yes      Comment: report \"2 quarts a day\"      Family History   Problem Relation Age of Onset    Hypertension Other       No Known Allergies   Home Medications:  None     Hospital medications:  Current Facility-Administered Medications   Medication Dose Route Frequency    ondansetron (ZOFRAN) injection 4 mg  4 mg IntraVENous Q4H PRN    hydrALAZINE (APRESOLINE) 20 mg/mL injection 10 mg  10 mg IntraVENous Q2H PRN    albuterol-ipratropium (DUO-NEB) 2.5 MG-0.5 MG/3 ML  3 mL Nebulization Q2H PRN    0.9% sodium chloride infusion  75 mL/hr IntraVENous CONTINUOUS    sodium chloride (NS) flush 5-10 mL  5-10 mL IntraVENous Q8H    sodium chloride (NS) flush 5-10 mL  5-10 mL IntraVENous PRN    pantoprazole (PROTONIX) 40 mg in sodium chloride 0.9 % 10 mL injection  40 mg IntraVENous Q12H    piperacillin-tazobactam (ZOSYN) 3.375 g in 0.9% sodium chloride (MBP/ADV) 100 mL  3.375 g IntraVENous Q8H    sodium chloride (NS) flush 5-10 mL  5-10 mL IntraVENous Q8H    sodium chloride (NS) flush 5-10 mL  5-10 mL IntraVENous PRN    lactulose (CHRONULAC) solution 30 g  30 g Oral TID     No current outpatient prescriptions on file.      REVIEW OF SYSTEMS:     [x]    Total of 11 systems reviewed as follows:  Const:   negative fever, negative chills, negative weight loss  Eyes:   negative diplopia or visual changes, negative eye pain  ENT:   negative coryza, negative sore throat  Resp:   negative cough, hemoptysis, dyspnea  Cards:  negative for chest pain, palpitations, lower extremity edema  :  negative for frequency, dysuria and hematuria  Skin:   negative for rash and pruritus  Heme:   negative for easy bruising and gum/nose bleeding  MS:  negative for myalgias, arthralgias, back pain and muscle weakness  Neurolo:  negative for headaches, dizziness, vertigo, memory problems   Psych: negative for feelings of anxiety, depression     Pertinent Positives include :    Objective:   VITALS:    Visit Vitals    BP 98/57    Pulse 81    Temp (!) 93 °F (33.9 °C)    Resp 24    Ht 5' 10\" (1.778 m)    Wt 74.8 kg (165 lb)    SpO2 97%    BMI 23.68 kg/m2     Temp (24hrs), Av.8 °F (33.8 °C), Min:92.6 °F (33.7 °C), Max:93 °F (33.9 °C)    PHYSICAL EXAM:   General:    Alert, cooperative, no distress, appears stated age. Head:   Normocephalic, without obvious abnormality, atraumatic. Eyes:   Conjunctivae clear, +icteric sclerae. Pupils are equal  Nose:  Nares normal. No drainage or sinus tenderness. Throat:    Lips, mucosa, and tongue normal.  No Thrush  Neck:  Supple, symmetrical,  no adenopathy, thyroid: non tender  Back:    Symmetric,  No CVA tenderness. Lungs:   CTA bilaterally. No wheezing/rhonchi/rales. Chest wall:  No tenderness or deformity. No Accessory muscle use. Heart:   Regular rate and rhythm,  no murmur, rub or gallop. Abdomen:   Soft, non-tender. Not distended. Bowel sounds normal. No masses  Extremities: Atraumatic, No cyanosis. No edema. No clubbing  Skin:     Texture, turgor normal. No rashes/lesions; +jaundice  Lymph: Cervical, supraclavicular normal.  Psych:  Variable insight. Not depressed. Not anxious or agitated. Neurologic: EOMs intact. Normal strength, A/O X 3.      LAB DATA REVIEWED:    Recent Results (from the past 48 hour(s))   EKG, 12 LEAD, INITIAL    Collection Time: 07/10/17 11:43 AM   Result Value Ref Range    Ventricular Rate 77 BPM    Atrial Rate 77 BPM    P-R Interval 146 ms    QRS Duration 66 ms    Q-T Interval 400 ms    QTC Calculation (Bezet) 452 ms    Calculated P Axis 45 degrees    Calculated R Axis 52 degrees    Calculated T Axis 28 degrees    Diagnosis       Normal sinus rhythm  Nonspecific T wave abnormality  When compared with ECG of 2016 12:36,  premature ventricular complexes are no longer present  Nonspecific T wave abnormality now evident in Anterior leads  Confirmed by Gina Henao ([id number]) on 7/10/2017 3:17:50 PM     CBC WITH AUTOMATED DIFF    Collection Time: 07/10/17 12:05 PM   Result Value Ref Range    WBC 12.2 (H) 4.1 - 11.1 K/uL    RBC 3.04 (L) 4.10 - 5.70 M/uL    HGB 10.4 (L) 12.1 - 17.0 g/dL    HCT 28.7 (L) 36.6 - 50.3 %    MCV 94.4 80.0 - 99.0 FL    MCH 34.2 (H) 26.0 - 34.0 PG    MCHC 36.2 30.0 - 36.5 g/dL    RDW 21.7 (H) 11.5 - 14.5 %    PLATELET 211 607 - 337 K/uL    NEUTROPHILS 76 (H) 32 - 75 %    LYMPHOCYTES 13 12 - 49 %    MONOCYTES 11 5 - 13 %    EOSINOPHILS 0 0 - 7 %    BASOPHILS 0 0 - 1 %    ABS. NEUTROPHILS 9.3 (H) 1.8 - 8.0 K/UL    ABS. LYMPHOCYTES 1.6 0.8 - 3.5 K/UL    ABS. MONOCYTES 1.3 (H) 0.0 - 1.0 K/UL    ABS. EOSINOPHILS 0.0 0.0 - 0.4 K/UL    ABS.  BASOPHILS 0.0 0.0 - 0.1 K/UL    RBC COMMENTS ANISOCYTOSIS  2+       URINALYSIS W/MICROSCOPIC    Collection Time: 07/10/17  1:52 PM   Result Value Ref Range    Color DEBRA      Appearance CLOUDY (A) CLEAR      Specific gravity 1.011 1.003 - 1.030      pH (UA) 6.5 5.0 - 8.0      Protein NEGATIVE  NEG mg/dL    Glucose NEGATIVE  NEG mg/dL    Ketone 15 (A) NEG mg/dL    Blood NEGATIVE  NEG      Urobilinogen 2.0 (H) 0.2 - 1.0 EU/dL    Nitrites NEGATIVE  NEG      Leukocyte Esterase NEGATIVE  NEG      WBC 0-4 0 - 4 /hpf    RBC 0-5 0 - 5 /hpf    Epithelial cells FEW FEW /lpf    Bacteria NEGATIVE  NEG /hpf   BILIRUBIN, CONFIRM    Collection Time: 07/10/17  1:52 PM   Result Value Ref Range    Bilirubin UA, confirm POSITIVE (A) NEG     CK W/ CKMB & INDEX    Collection Time: 07/10/17  2:57 PM   Result Value Ref Range    CK 62 39 - 308 U/L    CK - MB 2.5 <3.6 NG/ML    CK-MB Index 4.0 (H) 0 - 2.5     AMMONIA    Collection Time: 07/10/17  2:57 PM   Result Value Ref Range    Ammonia 93 (H) <88 UMOL/L   METABOLIC PANEL, COMPREHENSIVE    Collection Time: 07/10/17  2:57 PM   Result Value Ref Range    Sodium 129 (L) 136 - 145 mmol/L    Potassium 2.7 (LL) 3.5 - 5.1 mmol/L    Chloride 95 (L) 97 - 108 mmol/L    CO2 24 21 - 32 mmol/L    Anion gap 10 5 - 15 mmol/L    Glucose 83 65 - 100 mg/dL    BUN 15 6 - 20 MG/DL    Creatinine 1.69 (H) 0.70 - 1.30 MG/DL    BUN/Creatinine ratio 9 (L) 12 - 20      GFR est AA 51 (L) >60 ml/min/1.73m2    GFR est non-AA 42 (L) >60 ml/min/1.73m2    Calcium 7.6 (L) 8.5 - 10.1 MG/DL    Bilirubin, total 16.9 (H) 0.2 - 1.0 MG/DL    ALT (SGPT) 33 12 - 78 U/L    AST (SGOT) 115 (H) 15 - 37 U/L    Alk. phosphatase 131 (H) 45 - 117 U/L    Protein, total 7.2 6.4 - 8.2 g/dL    Albumin 1.1 (L) 3.5 - 5.0 g/dL    Globulin 6.1 (H) 2.0 - 4.0 g/dL    A-G Ratio 0.2 (L) 1.1 - 2.2     TROPONIN I    Collection Time: 07/10/17  2:57 PM   Result Value Ref Range    Troponin-I, Qt. <0.04 <0.05 ng/mL   LIPASE    Collection Time: 07/10/17  2:57 PM   Result Value Ref Range    Lipase 129 73 - 393 U/L   MAGNESIUM    Collection Time: 07/10/17  2:57 PM   Result Value Ref Range    Magnesium 2.2 1.6 - 2.4 mg/dL   PROTHROMBIN TIME + INR    Collection Time: 07/10/17  2:57 PM   Result Value Ref Range    INR 3.5 (H) 0.9 - 1.1      Prothrombin time 36.4 (H) 9.0 - 11.1 sec   PTT    Collection Time: 07/10/17  2:57 PM   Result Value Ref Range    aPTT 81.2 (H) 22.1 - 32.5 sec    aPTT, therapeutic range     58.0 - 77.0 SECS   CBC WITH AUTOMATED DIFF    Collection Time: 07/11/17  3:13 AM   Result Value Ref Range    WBC 12.9 (H) 4.1 - 11.1 K/uL    RBC 3.45 (L) 4.10 - 5.70 M/uL    HGB 12.0 (L) 12.1 - 17.0 g/dL    HCT 32.7 (L) 36.6 - 50.3 %    MCV 94.8 80.0 - 99.0 FL    MCH 34.8 (H) 26.0 - 34.0 PG    MCHC 36.7 (H) 30.0 - 36.5 g/dL    RDW 20.5 (H) 11.5 - 14.5 %    PLATELET 189 (L) 243 - 400 K/uL    NEUTROPHILS 76 (H) 32 - 75 %    LYMPHOCYTES 12 12 - 49 %    MONOCYTES 12 5 - 13 %    EOSINOPHILS 0 0 - 7 %    BASOPHILS 0 0 - 1 %    ABS. NEUTROPHILS 9.9 (H) 1.8 - 8.0 K/UL    ABS. LYMPHOCYTES 1.5 0.8 - 3.5 K/UL    ABS. MONOCYTES 1.5 (H) 0.0 - 1.0 K/UL    ABS. EOSINOPHILS 0.0 0.0 - 0.4 K/UL    ABS.  BASOPHILS 0.0 0.0 - 0.1 K/UL    RBC COMMENTS ANISOCYTOSIS  1+        RBC COMMENTS TARGET CELLS  PRESENT        WBC COMMENTS TOXIC GRANULATION      DF SMEAR SCANNED     METABOLIC PANEL, BASIC    Collection Time: 07/11/17  3:13 AM   Result Value Ref Range    Sodium 125 (L) 136 - 145 mmol/L    Potassium 3.8 3.5 - 5.1 mmol/L    Chloride 93 (L) 97 - 108 mmol/L    CO2 20 (L) 21 - 32 mmol/L    Anion gap 12 5 - 15 mmol/L    Glucose 89 65 - 100 mg/dL    BUN 17 6 - 20 MG/DL    Creatinine 1.87 (H) 0.70 - 1.30 MG/DL    BUN/Creatinine ratio 9 (L) 12 - 20      GFR est AA 46 (L) >60 ml/min/1.73m2    GFR est non-AA 38 (L) >60 ml/min/1.73m2    Calcium 8.0 (L) 8.5 - 10.1 MG/DL   MAGNESIUM    Collection Time: 07/11/17  3:13 AM   Result Value Ref Range    Magnesium 2.6 (H) 1.6 - 2.4 mg/dL   PHOSPHORUS    Collection Time: 07/11/17  3:13 AM   Result Value Ref Range    Phosphorus 4.4 2.6 - 4.7 MG/DL   HEPATIC FUNCTION PANEL    Collection Time: 07/11/17  3:13 AM   Result Value Ref Range    Protein, total 8.9 (H) 6.4 - 8.2 g/dL    Albumin 1.4 (L) 3.5 - 5.0 g/dL    Globulin 7.5 (H) 2.0 - 4.0 g/dL    A-G Ratio 0.2 (L) 1.1 - 2.2      Bilirubin, total 21.3 (H) 0.2 - 1.0 MG/DL    Bilirubin, direct 12.2 (H) 0.0 - 0.2 MG/DL    Alk. phosphatase 155 (H) 45 - 117 U/L    AST (SGOT) 187 (H) 15 - 37 U/L    ALT (SGPT) 47 12 - 78 U/L   AMMONIA    Collection Time: 07/11/17  3:14 AM   Result Value Ref Range    Ammonia 30 <32 UMOL/L   HGB & HCT    Collection Time: 07/11/17 10:01 AM   Result Value Ref Range    HGB 10.5 (L) 12.1 - 17.0 g/dL    HCT 28.2 (L) 36.6 - 50.3 %     IMAGING RESULTS:   [x]      I have personally reviewed the actual   []    CXR  [x]    CT  [x]     US  MRI Abd Pending    CT ABD PELV W CONT 7/11/17  FINDINGS:   There is a mild amount of ascites. There is diffuse nodularity and  nonhomogeneous appearance to the liver worsened since the prior examination.   This is compatible with cirrhosis, additional masses cannot be excluded by this  examination only, nonemergent MRI with special attention to that area would be  of value. The spleen is normal in size. The lung bases show some chronic  interstitial prominence. It is a hiatal hernia. The pancreas does not show  significant duct dilatation, there is however diffuse decreased density in the  head and uncinate process. The kidneys are unobstructed. The colon show diffuse  bowel wall thickening suggestive of colitis. There is some mild small bowel  distention also with wall thickening but no definite suggestion of obstruction. The bladder is midline. The prostate is enlarged with minimal calcifications. Major vessels appear patent. IMPRESSION  Very abnormal liver which should be further evaluated. Ascites. Possible pancreatic head mass. Diffuse colitis. ULTRASOUND ABDOMEN, limited 7/10/17  FINDINGS:   Routine ultrasound images of the abdomen were obtained. The liver is nodular in contour with heterogeneously increased echotexture. No  evidence for focal abnormality. The common bile duct is normal measuring 2 mm in  diameter. The gallbladder contains sludge and a sludge ball versus nonshadowing  stone. The main portal vein is patent with hepatopedal flow. The pancreas head is unremarkable. The remainder is obscured by bowel gas. The right kidney measures 11.2 cm in length. No focal lesion or hydronephrosis. Small amount of ascites  IMPRESSION:   Nodular contour of the liver may be due to cirrhosis. Small amount of ascites. Diffusely increased heterogeneous echotexture of the liver. No evidence for  biliary ductal dilatation  Sludge and a sludge ball versus nonshadowing stone in the gallbladder. The  gallbladder is otherwise unremarkable in appearance.     Recommendations/Plan:      Active Problems:    Hepatic encephalopathy ([address] (7/10/2017)       ___________________________________________________  RECOMMENDATIONS:    52yo M with EtOH+induced cirrhosis and hx pancreatitis with evidence of hepatic encephalopathy by elevated NH3 and with marked hyperbilirubinemia in setting of ongoing EtOH abuse. The elevation of AST:ALT 3:1, low albumin, and behavior suggest some degree of EtOH hepatitis with calculated Discriminant Factor 138 which bears poor prognosis. The is felt to be less likely a presentation of biliary obstruction from pancreatic mass as bile ducts are not dilated and he is a poor candidate for MRCP/MRI due to intolerance and inability to cooperate with eval.  Additionally he is hyponatremic, shows orsening renal function, and has marked coagulapathy due to hepatic dysfunction, all poor prognostic signs.   Investigation of bleeding can be deferred until coags corrected and other findings improved  Plan:  1) Continue daily lactulose with neuro checks to make sure able to tolerate  2) Vitamin K SC to correct coags  3) Follow Cr, H/H, Na  4) IV steroids  5) D/C Zosyn- no clear infection and elevated WBC more c/w the hepatitis  6) Prophylaxis for EtOH WD  7) Check pending Hep A/B/C panel  8) MRI not felt to be of benefit at this time    Discussed Code Status:    [x]    Full Code      []    DNR    ___________________________________________________  Care Plan discussed with:    [x]    Patient   []    Family   [x]    Nursing   []    Attending  Total Time : 50   minutes   ___________________________________________________  GI: Jurgen Dye MD

## 2017-07-11 NOTE — ED NOTES
Attempted to call consult to Dr Kaylee Barrios. On hold for an extended period of time.  Will attempt to call back

## 2017-07-11 NOTE — PROGRESS NOTES
1600: Received bedside and verbal shift report from transferring RN. 1700: Assessment complete, see summary for details; pt alert and oriented to self and place, NSR, pulses palpable in all 4 extremities, temp 97.7  1800: Pt sister at bedside, updated on patients condition and gave pamphlet with patients MRN number. [id number]: CIWA 15, sweat beads noticeable on forehead, attempting to get out of bed, pt states \" I want to get the beer that is on the floor\"; gave 2mg of PRN Ativan.  1900: Gave bedside and verbal shift report to [employer].

## 2017-07-11 NOTE — ED NOTES
Bright red blood noted in patient stool. Attempted to notify Dr. Macho Alvarado. Unable to get ahold of him at this time.

## 2017-07-11 NOTE — ED NOTES
Bedside shift change report given to Leslee Ford (oncoming nurse) by Johnathan Carson RN (offgoing nurse). Report included the following information SBAR.

## 2017-07-11 NOTE — PROGRESS NOTES
Pt Abdomen MRI attempted, 7/10/2017. Pt unable to follow commands and hold still. Pt attempted to come out of machine during exam. So I aborted exam. Showed the few images I had to Dr. Catalina Caldwell.  He deemed unreadable and to be tried again at a later time when the patient is more cooperative

## 2017-07-11 NOTE — ED NOTES
Assisted patient to bathroom. This RN remained in patient room. Pt instructed to call when finished in bathroom to assist patient back to bed.

## 2017-07-11 NOTE — ED NOTES
Notified Dr. Belem Bhatti that patient was not able to tolerate MRI.  Verbal order to retry test in the AM.

## 2017-07-11 NOTE — ED NOTES
Pt no distress, sleeping with lights dimmed, call bell in reach, side rails up x 2. Pt remains on cardiac monitor. Pt in view of nurse's station.

## 2017-07-11 NOTE — PROGRESS NOTES
7/11/2017  5:20 PM    INTENSIVIST PROGRESS NOTE:     Patient seen and evaluated   54 yo male hx of etoh presents with worsening MS  Found to have hyperbilirubinemia and colitis on abd ct  Started on iv abx, IVF  Resting comfortable  No acute complaints     Visit Vitals    BP 98/56    Pulse (!) 101    Temp 96.7 °F (35.9 °C)    Resp 22    Ht 5' 10\" (1.778 m)    Wt 74.8 kg (165 lb)    SpO2 99%    BMI 23.68 kg/m2       General: no distress, lethargic  CV: RRR  Lungs: clear    NO CXR     Labs reviewed    A/P:  Empiric abx  Lactulose for elevated ammonia  IVF  Monitor renal fx  Will assist on disposition planning when stable for transfer  Katya Wood MD

## 2017-07-12 NOTE — PROGRESS NOTES
7/12/2017  5:20 PM    INTENSIVIST PROGRESS NOTE:     Patient seen and evaluated   56 yo male hx of etoh presents with worsening MS  Found to have hyperbilirubinemia and colitis on abd ct  Started on iv abx, IVF  More lethargic this am  Hypotensive      Visit Vitals    BP (!) 86/50    Pulse 98    Temp 95.7 °F (35.4 °C)    Resp (!) 35    Ht 5' 10\" (1.778 m)    Wt 74.8 kg (165 lb)    SpO2 93%    BMI 23.68 kg/m2       General: lethargic  CV: RRR  Lungs: clear    CXR: bibasilar atx    Labs reviewed    A/P:  O2  Empiric abx  Lactulose for elevated ammonia  IVF  May need pressors  Monitor renal fx, worse today  Will assist on disposition planning when stable for transfer  Kary Kramer MD

## 2017-07-12 NOTE — PROGRESS NOTES
0800 assessment complete Patient jaundiced in eyes and skin. Abdomen distended. Patient tachypnic and shallow respirations. Minimal to no response to pain does not withdraw in lower extremities. BP low new orders received for 1000 ML Normal Saline bolus from Dr. Connor Cushing. Orders received for Terrell drip titrate to keep Map>65 if blood pressure does not improve with bolus. 0900 Renal consult called. 1030 NGT and Vang catheter placed per orders. Minimal urine output from vang. Dr. Joe Stein paged. 1103 Xray at bedside for KUB. 1200 Patient remains hypotensive titrating terrell up, multiple peripheral IV's have blown, Dr. Connor Cushing paged New orders received for PICC line placement. 1205 PICC team called for PICC line. Asked to call renal to clarify that line placement is ok. 200 Dr. Miller(Nephrology) paged. Notified of request for PICC line. MD ok with PICC line being placed. Notified that patient has had 20ml of urine output this am since vang catheter was placed. 1300 patients blood sugar 44 given 1 amp dextrose 50%     1315 recheck blood sugar 179 Dr. Aguilar Spann paged to notify. MD to place orders. 1800 Lactic acid of 8.1 called to Dr. Sheikh Child. New orders received for hydrocortisone IV 50 mg IV every 8 hours     1900 Report given to REID Plasencia RN

## 2017-07-12 NOTE — PROGRESS NOTES
Attended Interdisciplinary Rounds where patient care was discussed. Mignon Lucas.  Aidan Bates D.Min, M.A. Thu Vazquez

## 2017-07-12 NOTE — WOUND CARE
Pressure Ulcer Prevention In basket Alert Received for Max < 14 (moderate risk).      Suggested Care Plan/Interventions for Nursing  1. Complete Max Pressure Ulcer Risk Scale and use sub scores to identify appropriate interventions. 2. Perform Assessment: skin, changes in LOC, visual cues for pain, monitor skin under medical devices  3. Respond to Reduced Sensory Perception: changes in LOC, check visual cues for pain, float heels, suspension boots, pressure redistribution bed/mattress/chair cushion, turning and reposition approximately every 2 hours (pillows & wedges), pad between skin to skin, turn & reposition  4. Manage Moisture: absorbent under pads, internal / external urinary device, internal /  external fecal device, minimize layers, contain wound drainage, access need for specialty bed, limit adult briefs, maintain skin hydration (lotion/cream), moisture barrier, offer toileting every hour  5. Promote Activity: increase time out of bed, chair cushion, PT/OT evaluation, trapeze to reposition, pressure redistribution bed/mattress/chair  6. Address Reduced Mobility: float heels / suspension boot, HOB 30 degrees or less, pressure redistribution bed/mattress/cushion, PT / OT evaluation, turn and reposition approximately every 2 hours (pillows & wedges)  7. Promote Nutrition: document food / fluid / supplement intake, encourage/assist with meals as needed  8. Reduce Friction and Shear: transferring/repositioning devices (lift/draw sheet), lift team/ patient mobility team, feet elevated on foot rest, minimize layers, foam dressing / transparent film / skin sealants, protective barrier creams and emollients, transfer aides (board, Liss lift, ceiling lift, stand assist), HOB 30 degrees or less, trapeze to reposition.   Wound Care Team

## 2017-07-12 NOTE — PROGRESS NOTES
1900-Bedside report received from Sheila Mosley [facility].   2000-Shift assessment complete, sedated at this time withdraws from pain, received prn ativan for etoh withdrawal prior to shift change, family went home for the evening, NSR, NS infusing at 125ml/hr, will continue to monitor. 2100-Hypothermic rectal temp 94.1, aydee hugger restarted. Will continue to monitor  2145-Paged on call hospitalist.   2153-Spoke with Dr. Rosa Dale regarding patient condition, new orders received to hold lactulose for tonight due to patient being very lethargic responding only to pain s/p ativan, reassess in am, bs currently 80, prn orders for dextrose, bs checks q6h, bladder scan/straight cath prn, will continue to monitor. 2300-Temp now 98.2 axillary, aydee hugger on standby, will continue to monitor. 0000-Reassessment complete, eyes open spontaneously, will continue to monitor. 0033-bs 63, treated with D50.  0052-repeat bs 124.  0400-Reassessment complete, no changes. 0700-Bedside report given to Eddie hodge RN.  Renetta Terry informed of low bp 86/50, no new orders at this time.

## 2017-07-12 NOTE — PROGRESS NOTES
1900-Bedside report received from [place], Replaced by [facility].   1930-Shift assessment complete, unresponsive, withdraws from pain, pupils sluggish, annia gtt 150mcg, bipap continued sats 97%, minimal urinary output, will continue to monitor. 1935-Spoke with Dr. Ayan Renner regarding patient condition, new orders for repeat ABGs at [facility].   2310-Spoke with Dr. Ayan Renner pts sats dropped to the 60s, increased O2 100%, deep suctioned, now 92%, informed of patient condition and blood gas results, new orders for two amps bicarb now and change bipap settings 12/8, will continue to monitor. 2330-Reassessment complete, patient condition remains the same, no changes, sats 96%. 0400-Reassessment complete, increased spontaneous movement, no other changes, annia gtt at 170mcg. 0710-Bedside report given to Eddie hodge RN.

## 2017-07-12 NOTE — CONSULTS
Nephrology Consult Note     Karthik Earl     www. [facility]                    Phone - [phone]   Patient: Therese Rogers  YOB: 1960     Date- 7/12/2017   MRN: [id number]  PATIENT PCP:Madison Dodge MD   Age: 55 y.o. Sex: male      ADMIT DATE:7/10/2017    CONSULTATION DATE:7/12/2017                REASON FOR CONSULTATION: I have been asked to see this patient by [facility] for  Acute renal failure  ASSESSMENT:   Acute renal failure secondary to multiple possibilities. 1.Pre renal factors - hypotension - renal hypoperfusion, poor po intake  2. Intra reanl factors - ATN due to IVP dye and hypotension, hepatorenal syndrome  3. Post renal factors - less likely  ·   · Metabolic acidosis  · Hyponatremia  · Hypokalemia  · SHOCK  · anemia  · hypocalcemia   · Altered mental status  · ETOH abuse   · Hepatic encephalopathy  Active Problems:    Hepatic encephalopathy ([address] (7/10/2017)      PLAN:   · Change ivf to bicarb gtt  · No dialysis indicated today  · If urine out put remains low - he will need renal replacement therapy tomorrow  · Okay to place picc line  · Check renal usg  · Check urine lytes  · Iv calcium  · Check iron panel    [x] High complexity decision making was performed  [x] Patient is at high-risk of decompensation with multiple organ involvement    Subjective:   HPI: Therese Rogers is a 55 y.o.  male. He is admitted with weakness. He has developed arf with creatinine level 2.61 with bun 22. His cr. Was 1.36 on 7-10-17  His cr. 1.1 in April 2016. He is confused. He is not able to give me any history. He has been hypotensive requiring pressors   He received iv contrast on 7-10-17  He has hepatic encephalopathy with ammonia level 93 on admission  He na was 129 on admission which dropped to 125 on 7-11-17. His na improved to 133 today  He has h/o hypertension in past. Not on any bp meds prior to admission  No family member at bedside.     Review of Systems:  Can't access due to patient's current condition    Past Medical Hx:   Past Medical History:   Diagnosis Date    Alcohol abuse     GERD (gastroesophageal reflux disease)     occasionally only; takes OTC meds prn    Hypertension     no TX now    Other ill-defined conditions     pancreatitis hx; resolved        Past Surgical Hx:     Past Surgical History:   Procedure Laterality Date    HX OTHER SURGICAL      cataract left     FL COLONOSCOPY FLX DX W/COLLJ SPEC WHEN PFRMD  3/7/2013            Social Hx:  reports that he has been smoking. He has been smoking about 1.00 pack per day. He does not have any smokeless tobacco history on file. He reports that he drinks alcohol. He reports that he does not use illicit drugs. Family History   Problem Relation Age of Onset    Hypertension Other      Medications:  Prior to Admission medications    Not on File       No Known Allergies   Objective:    Vitals:    Vitals:    07/12/17 1200 07/12/17 1300 07/12/17 1330 07/12/17 1400   BP: 90/53 (!) 85/49 (!) 87/52 95/55   Pulse: 97 99 99 (!) 104   Resp: (!) 34 30 (!) 33 (!) 33   Temp: 96.8 °F (36 °C)      SpO2: 94% 94% 92% 92%   Weight:       Height:         I&O's:  07/11 0701 - 07/12 0700  In: 2050 [I.V.:2050]  Out: -     Physical Exam:  General:confused. Moaning. Not answering any questions  Eyes:yellow sclera, No conjunctival pallor  Neck:Supple,no mass palpable  Lungs:Clears to auscultation Bilaterally,   CVS:RRR, S1 S2 normal,  Abdomen:Soft, Non tender, disteneded  Extremities: no LE edema  Skin:No rash or lesions, Warm and DRY   Psych:Can't access due to patient's current condition  :  Willis +  Musculoskeletal : no redness, no joint tenderness  NEURO:Can't access due to patient's current condition      CODE STATUS:  full  Care Plan discussed with:  nurse     Chart reviewed. ECG[de-identified] Rev:no  Xray/CT/US/MRI REV:yes  CT ABDO  There is a mild amount of ascites.  There is diffuse nodularity and  nonhomogeneous appearance to the liver worsened since the prior examination. This is compatible with cirrhosis, additional masses cannot be excluded by this  examination only, nonemergent MRI with special attention to that area would be  of value. The spleen is normal in size. The lung bases show some chronic  interstitial prominence. It is a hiatal hernia. The pancreas does not show  significant duct dilatation, there is however diffuse decreased density in the  head and uncinate process. The kidneys are unobstructed. The colon show diffuse  bowel wall thickening suggestive of colitis. There is some mild small bowel  distention also with wall thickening but no definite suggestion of obstruction. The bladder is midline. The prostate is enlarged with minimal calcifications. Major vessels appear patent.     IMPRESSION   impression: Very abnormal liver which should be further evaluated. Ascites. Possible pancreatic head mass.  Diffuse colitis.           Lab Data Personally Reviewed: (see below)  Recent Labs      07/12/17   0329  07/11/17   0313  07/10/17   1457   NA  133*  125*  129*   K  3.4*  3.8  2.7*   CL  102  93*  95*   CO2  16*  20*  24   GLU  85  89  83   BUN  22*  17  15   CREA  2.61*  1.87*  1.69*   CA  6.9*  8.0*  7.6*   MG   --   2.6*  2.2   PHOS   --   4.4   --      Recent Labs      07/12/17 0329 07/11/17   1623  07/11/17   1001  07/11/17   0313  07/10/17   1205   WBC  14.7*   --    --   12.9*  12.2*   HGB  8.9*  9.3*  10.5*  12.0*  10.4*   HCT  25.0*  25.3*  28.2*  32.7*  28.7*   PLT  108*   --    --   144*  172     Recent Labs      07/12/17 0329 07/11/17   0313  07/10/17   1457   NA  133*  125*  129*   K  3.4*  3.8  2.7*   CL  102  93*  95*   CO2  16*  20*  24   BUN  22*  17  15   CREA  2.61*  1.87*  1.69*   GLU  85  89  83   CA  6.9*  8.0*  7.6*   MG   --   2.6*  2.2   PHOS   --   4.4   --      Lab Results   Component Value Date/Time    Color DEBRA 07/10/2017 01:52 PM    Appearance CLOUDY 07/10/2017 01:52 PM Specific gravity 1.011 07/10/2017 01:52 PM    pH (UA) 6.5 07/10/2017 01:52 PM    Protein NEGATIVE  07/10/2017 01:52 PM    Glucose NEGATIVE  07/10/2017 01:52 PM    Ketone 15 07/10/2017 01:52 PM    Bilirubin NEGATIVE  06/25/2015 06:10 PM    Urobilinogen 2.0 07/10/2017 01:52 PM    Nitrites NEGATIVE  07/10/2017 01:52 PM    Leukocyte Esterase NEGATIVE  07/10/2017 01:52 PM    Epithelial cells FEW 07/10/2017 01:52 PM    Bacteria NEGATIVE  07/10/2017 01:52 PM    WBC 0-4 07/10/2017 01:52 PM    RBC 0-5 07/10/2017 01:52 PM     Lab Results   Component Value Date/Time    Culture result: NO GROWTH AFTER 20 HOURS 07/11/2017 10:59 AM    Culture result: MIXED UROGENITAL ZAYRA ISOLATED 04/30/2016 03:54 PM     None       Medications list Personally Reviewed   [x]      Yes     []               No    Thank you for allowing us to participate in the care this patient. We will follow patient with you. Signed By: Wilma oBrrero MD  [facility]  [facility] [address]  [place], [address]  Phone - [phone]         Fax - [phone] [facility] Office  [address] [place] [address]  Phone - [phone]        Fax - [phone]     www. [facility]

## 2017-07-12 NOTE — PROGRESS NOTES
Hospitalist Progress Note    NAME: Nnamdi Henning   :  1960   MRN:  [id number]   LOS:   1      Assessment / Plan:  Alcoholic hepatitis, Hyperbilirubinemia   Hepatic encephalopathy  ETOH abuse with alcoholic cirrhosis  Coagulopathy  -admitted to PCU however no beds available  -elevated AST/ALT, elevated bili, discriminant factor high  -appreciate GI, Dr. Gomez Nguyen evaluation  -less likely obstruction as bile ducts not dilated  -continue supportive care, continue steroids  -s/p vitamin K, follow coags  -CIWA for withdrawal  -lactulose  -overall poor prognosis    Pancolitis  GI bleed  Hypotension  -colitis on CT, GI bleeding, coagulopathy likely contributing  -monitor hgb closely 12-->9 today  -hypothermic today, continue bear hugger  -send stool studies, f/u blood cultures  -will continue abx for now    Acute renal failure with dehydration  -consider pre renal, hepatorenal  -continue fluids, will likely have to ask nephrology to evaluation tomorrow if Cr continues to worsen      Pancreatic abnormality cannot determine mass vs pseudocyst  Anemia, chronic illness  Hyponatremia, acute  Hypokalemia, acute          Code status: Full  Prophylaxis: SCD's  Recommended Disposition: pending course     Subjective:     Chief Complaint: f/u encephalopathy  Patient appears ill,confused, not answering questions        Review of Systems:  Symptom Y/N Comments  Symptom Y/N Comments   Fever/Chills    Chest Pain     Poor Appetite    Edema     Cough    Abdominal Pain     Sputum    Joint Pain     SOB/TORREZ    Pruritis/Rash     Nausea/vomit    Tolerating PT/OT     Diarrhea    Tolerating Diet     Constipation    Other       Could NOT obtain due to:      Objective:     VITALS:   Last 24hrs VS reviewed since prior progress note.  Most recent are:  Patient Vitals for the past 24 hrs:   Temp Pulse Resp BP SpO2   17 -  28 113/68 94 %   17 1930 - 94 (!) 32 - 94 %   17 1800 - 89 29 108/66 94 %   17 1736 - 98 26 107/66 94 %   07/11/17 1620 97.7 °F (36.5 °C) (!) 101 (!) 33 104/58 93 %   07/11/17 1515 - (!) 101 - 98/56 99 %   07/11/17 1508 - - 22 - -   07/11/17 1500 - 98 - 106/64 96 %   07/11/17 1457 - - 22 - -   07/11/17 1455 96.7 °F (35.9 °C) - - - -   07/11/17 1448 - - 24 - -   07/11/17 1445 - 93 - 100/54 98 %   07/11/17 1430 - 95 24 (!) 89/63 97 %   07/11/17 1415 - 95 17 105/62 96 %   07/11/17 1400 - 90 17 98/62 100 %   07/11/17 1345 - 86 29 100/57 97 %   07/11/17 1330 - 91 18 91/74 95 %   07/11/17 1315 - 87 21 100/63 95 %   07/11/17 1302 - 79 25 95/46 96 %   07/11/17 1300 (!) 94.6 °F (34.8 °C) - - - -   07/11/17 1248 - 78 22 91/59 95 %   07/11/17 1240 - 77 22 90/54 95 %   07/11/17 1130 - 81 24 98/57 97 %   07/11/17 1126 (!) 93 °F (33.9 °C) - - - -   07/11/17 1125 - 80 21 (!) 85/46 99 %   07/11/17 1051 - 78 18 133/49 97 %   07/11/17 1032 - 74 19 (!) 86/61 97 %   07/11/17 1015 - 75 19 99/66 98 %   07/11/17 1000 - 71 19 98/62 98 %   07/11/17 0952 - 75 14 104/64 99 %   07/11/17 0950 - 77 22 (!) 81/49 97 %   07/11/17 0856 (!) 92.6 °F (33.7 °C) - - - -   07/11/17 0745 - 72 - 104/81 97 %   07/11/17 0713 - 76 - - 94 %   07/11/17 0710 - 75 20 (!) 111/96 -   07/11/17 0600 - 78 21 (!) 166/117 97 %   07/11/17 0455 - 78 18 (!) 158/127 97 %   07/11/17 0446 - 78 21 - 95 %   07/11/17 0444 - - - 127/69 -   07/11/17 0215 - - - 98/64 -   07/11/17 0204 - 66 22 - 95 %   07/10/17 2332 - - 24 - -   07/10/17 2330 - 76 15 105/66 95 %   07/10/17 2315 - 77 16 102/45 97 %       Intake/Output Summary (Last 24 hours) at 07/11/17 2110  Last data filed at 07/11/17 2000   Gross per 24 hour   Intake              575 ml   Output                0 ml   Net              575 ml        PHYSICAL EXAM:  General: Confused mild distress  EENT:  EOMI. ++icteric sclerae. Mucous membranes dry  Resp:  CTA bilaterally, no wheezing or rales. No accessory muscle use  CV:  Regular rhythm, no edema  GI:  Soft, non distended, non tender.  +Bowel sounds  Neurologic:  Confused, restless  Psych:   Poor insight, anxious  Skin:  No rashes, + jaundice  ________________________________________________________________________  Care Plan discussed with:    Comments   Patient x    Family      RN x    Care Manager     Consultant                        Multidiciplinary team rounds were held today with , nursing, pharmacist and clinical coordinator. Patient's plan of care was discussed; medications were reviewed and discharge planning was addressed. ________________________________________________________________________  Total NON critical care TIME:  30   Minutes  ________________________________________________________________________  Rexanne Opitz, MD     Procedures: see electronic medical records for all procedures/Xrays and details which were not copied into this note but were reviewed prior to creation of Plan. LABS:  I reviewed today's most current labs and imaging studies.   Pertinent labs include:  Recent Labs      07/11/17   1623  07/11/17   1001  07/11/17 0313  07/10/17   1205   WBC   --    --   12.9*  12.2*   HGB  9.3*  10.5*  12.0*  10.4*   HCT  25.3*  28.2*  32.7*  28.7*   PLT   --    --   144*  172     Recent Labs      07/11/17   0313  07/10/17   1457   NA  125*  129*   K  3.8  2.7*   CL  93*  95*   CO2  20*  24   GLU  89  83   BUN  17  15   CREA  1.87*  1.69*   CA  8.0*  7.6*   MG  2.6*  2.2   PHOS  4.4   --    ALB  1.4*  1.1*   TBILI  21.3*  16.9*   SGOT  187*  115*   ALT  47  33   INR   --   3.5*       Signed: Rexanne Opitz, MD

## 2017-07-12 NOTE — PROGRESS NOTES
PICC (Peripherally Inserted Central Catheter) line insertion  procedure note :     Procedure explained to patient's sister along with risks and benefits  and patient's sister agreed to proceed. Informed consent obtained from  Patient's sister. Patient teaching completed. Timeout completed. Pre-procedure assessment done. Maximum sterile barrier precautions observed throughout procedure. Lidocaine 1%  1    ml sq given prior to cannulation. Cannulated brachial  vein using ultrasound guidance and modified seldinger technique. Inserted 5  [place] dual  lumen PICC to right arm using [employer] Tip Location System and  [address]. Pt has    sinus   rhythm. PICC tip location was confirmed by 3 CG tip positioning system, indicating tall P wave and no negative deflection before P wave which would indicate that the PICC tip is properly placed in the distal SVC or at the [place]. PICC tip location was  confirmed by 2 PICC nurses and 3CG printout placed on patient's chart. Blood return verified and flushed with 20 ml normal saline in each port. Sterile dressing applied with biopatch, statLock and occlusive dressing as per protocol. Curos caps applied to each port. Patient tolerated procedure well with minimal blood loss ( less than 5 ml.)   Patient education material provided. PICC procedure performed by  : Krista España RN. PICC nurse  Assisted by :   Jennyfer Forrest RN  PICC nurse  Reason for access : reliable access / MD order /   Hemodynamically unstable  Poor vascular access  Complications related to insertion  : none  X-Ray : not applicable  Notified primary nurse  Alfie Rivera  RN  that  PICC line can be used.    Total Trimmed Length :  37   cm   External Length : 1  Cm  erin   PICC line site arm circumference:  24    cm   PICC catheter occupies  11   % of vein  Type of PICC: Bard Solo Power PICC   Ref # :     [id number]    Lot # :  [id number]   Expiration Date : 2018-04-30     Kelle French RN PICC Nurse, Vascular Access Team.

## 2017-07-12 NOTE — PROGRESS NOTES
GI Progress Note Tremayne Mcdermott  NAME:Sudhir Alberto DND:[birth year] LYC:[id number]   ATTG: Hien Khalil MD  PCP: Kiko Veliz MD  Date/Time:  7/12/2017 5:57 PM   Assessment:   · EtOH cirrhosis  · EtOH hepatitis  · Hepatorenal syndrome- doubling of Cr with no urine output following 1 L NS bolus and ongoing BiCarb drip  · Elvated LFTs- c/w cirrhosis, liver failure, EtOH induced hepatitis     Plan:   · His prognosis is dismal if this is true hepatorenal syndrome (HRS) in setting of his renal failure. He is not a candidate for transplant as he was engaged in active EtOH abuse up until his current admission  · Supportive care  · Continue IVF  · Follow coags, CBC, CMP, NH3  · Discussion with family needed to review poor prognosis and that he will not recover based on current indicators- if this is the case conversion to DNR may be appropriate     Subjective:   Discussed with RN events overnight. On pressors. No urine output. Placed on BiPap    Complaint Y/N Description   Abdominal Pain     Hematemesis     Hematochezia     Melena     Constipation     Diarrhea     Dyspepsia     Dysphagia     Jaundiced     Nausea/vomiting       Review of Systems:  Symptom Y/N Comments  Symptom Y/N Comments   Fever/Chills    Chest Pain     Cough    Headaches     Sputum    Joint Pain     SOB/TORREZ    Pruritis/Rash     Tolerating Diet    Other       Could NOT obtain due to:      Objective:   VITALS:   Last 24hrs VS reviewed since prior progress note.  Most recent are:  Visit Vitals    BP (!) 78/49 (BP 1 Location: Left arm, BP Patient Position: At rest)    Pulse 99    Temp 96.8 °F (36 °C)    Resp 30    Ht 5' 10\" (1.778 m)    Wt 74.8 kg (165 lb)    SpO2 97%    BMI 23.68 kg/m2       Intake/Output Summary (Last 24 hours) at 07/12/17 1757  Last data filed at 07/12/17 1346   Gross per 24 hour   Intake          2771.49 ml   Output               22 ml   Net          2749.49 ml     PHYSICAL EXAM:  General: WD, WN. confused, no acute distress    HEENT: NC, Atraumatic. PERRL. +icteric sclerae. Lungs: Tachypneic on BiPAP; CTA Bilaterally. No Wheezing/Rhonchi/Rales. Heart:  Regular  rhythm,  No murmur/Rubs/Gallops  Abdomen: Soft, Non distended, Non tender.  +Bowel sounds, no HSM  Extremities: No c/c/e  Neurologic:  CN 2-12 gi, A/O X pain only. No acute neurological distress   Psych:   No insight. Not anxious nor agitated. Lab and Radiology Data Reviewed: (see below)    Medications Reviewed: (see below)  PMH/SH reviewed - no change compared to H&P  ________________________________________________________________________  Total time spent with patient: 15 minutes ________________________________________________________________________  Care Plan discussed with:  Patient y   Family     RN y              Consultant:       Jurgen Dye MD     Procedures: see electronic medical records for all procedures/Xrays and details which were not copied into this note but were reviewed prior to creation of Plan. LABS:  Recent Labs      07/12/17   0329  07/11/17   1623   07/11/17   0313   WBC  14.7*   --    --   12.9*   HGB  8.9*  9.3*   < >  12.0*   HCT  25.0*  25.3*   < >  32.7*   PLT  108*   --    --   144*    < > = values in this interval not displayed.      Recent Labs      07/12/17   1505  07/12/17   0329  07/11/17   0313  07/10/17   1457   NA   --   133*  125*  129*   K   --   3.4*  3.8  2.7*   CL   --   102  93*  95*   CO2   --   16*  20*  24   BUN   --   22*  17  15   CREA   --   2.61*  1.87*  1.69*   GLU   --   85  89  83   CA   --   6.9*  8.0*  7.6*   MG  2.2   --   2.6*  2.2   PHOS  7.6*   --   4.4   --      Recent Labs      07/12/17   0329  07/11/17   0313  07/10/17   1457   SGOT  239*  187*  115*   AP  105  155*  131*   TP  6.8  8.9*  7.2   ALB  1.6*  1.4*  1.1*   GLOB  5.2*  7.5*  6.1*   LPSE   --    --   129     Recent Labs      07/12/17   0445  07/10/17   1457   INR  <0.9*  3.5*   PTP  <9.0*  36.4*   APTT   --   81.2*      No results for input(s): FE, TIBC, PSAT, FERR in the last 72 hours.    No results found for: FOL, RBCF  Recent Labs      07/12/17   1652   PH  7.28*   PCO2  23*   PO2  75*     Recent Labs      07/10/17   1457   CPK  62   CKMB  2.5     Lab Results   Component Value Date/Time    Color DEBRA 07/10/2017 01:52 PM    Appearance CLOUDY 07/10/2017 01:52 PM    Specific gravity 1.011 07/10/2017 01:52 PM    pH (UA) 6.5 07/10/2017 01:52 PM    Protein NEGATIVE  07/10/2017 01:52 PM    Glucose NEGATIVE  07/10/2017 01:52 PM    Ketone 15 07/10/2017 01:52 PM    Bilirubin NEGATIVE  06/25/2015 06:10 PM    Urobilinogen 2.0 07/10/2017 01:52 PM    Nitrites NEGATIVE  07/10/2017 01:52 PM    Leukocyte Esterase NEGATIVE  07/10/2017 01:52 PM    Epithelial cells FEW 07/10/2017 01:52 PM    Bacteria NEGATIVE  07/10/2017 01:52 PM    WBC 0-4 07/10/2017 01:52 PM    RBC 0-5 07/10/2017 01:52 PM       MEDICATIONS:  Current Facility-Administered Medications   Medication Dose Route Frequency    PHENYLephrine (NEOSYNEPHRINE) 30,000 mcg in 0.9% sodium chloride 250 mL infusion   mcg/min IntraVENous TITRATE    sodium chloride (NS) flush 10-30 mL  10-30 mL InterCATHeter PRN    sodium chloride (NS) flush 10 mL  10 mL InterCATHeter Q24H    sodium chloride (NS) flush 10 mL  10 mL InterCATHeter PRN    sodium chloride (NS) flush 10-40 mL  10-40 mL InterCATHeter Q8H    sodium chloride (NS) flush 20 mL  20 mL InterCATHeter PRN    heparin (porcine) pf 300 Units  300 Units InterCATHeter PRN    dextrose 5% and 0.9% NaCl infusion  100 mL/hr IntraVENous CONTINUOUS    dextrose 5% 1,000 mL with sodium bicarbonate (8.4%) 150 mEq infusion   IntraVENous CONTINUOUS    calcium gluconate 2 g in 0.9% sodium chloride 100 mL IVPB  2 g IntraVENous ONCE    ondansetron (ZOFRAN) injection 4 mg  4 mg IntraVENous Q4H PRN    albuterol-ipratropium (DUO-NEB) 2.5 MG-0.5 MG/3 ML  3 mL Nebulization Q2H PRN    sodium chloride (NS) flush 5-10 mL  5-10 mL IntraVENous Q8H    sodium chloride (NS) flush 5-10 mL  5-10 mL IntraVENous PRN    pantoprazole (PROTONIX) 40 mg in sodium chloride 0.9 % 10 mL injection  40 mg IntraVENous Q12H    piperacillin-tazobactam (ZOSYN) 3.375 g in 0.9% sodium chloride (MBP/ADV) 100 mL  3.375 g IntraVENous Q8H    methylPREDNISolone (PF) (SOLU-MEDROL) injection 20 mg  20 mg IntraVENous Q12H    LORazepam (ATIVAN) injection 1-2 mg  1-2 mg IntraVENous Q2H PRN    mupirocin (BACTROBAN) 2 % ointment   Both Nostrils BID    glucose chewable tablet 16 g  4 Tab Oral PRN    dextrose (D50W) injection syrg 12.5-25 g  12.5-25 g IntraVENous PRN    glucagon (GLUCAGEN) injection 1 mg  1 mg IntraMUSCular PRN    lactulose (CHRONULAC) solution 30 g  30 g Oral TID

## 2017-07-12 NOTE — CONSULTS
[address]  ABL:[id number]   GML:3/28/5936   -                    Consult received. I will see pt today. Thanks. Sulema Ashby, [address] [facility]  [facility] [address], [place], [address]  Phone - [phone]         Fax - [phone] [facility] Office  [address] [place] [address]  Phone - [phone]        Fax - [phone]     www. [email or web address]

## 2017-07-13 PROBLEM — R41.82 ALTERED MENTAL STATUS: Status: ACTIVE | Noted: 2017-01-01

## 2017-07-13 PROBLEM — R53.81 DEBILITY: Status: ACTIVE | Noted: 2017-01-01

## 2017-07-13 PROBLEM — R45.1 AGITATION: Status: ACTIVE | Noted: 2017-01-01

## 2017-07-13 PROBLEM — Z71.89 COUNSELING REGARDING ADVANCED CARE PLANNING AND GOALS OF CARE: Status: ACTIVE | Noted: 2017-01-01

## 2017-07-13 NOTE — CDMP QUERY
Account Number: [de-identified]  MRN: [id number]  Patient: Radha Gastelum  Created: 1953-19-09E00:49:96  Clinician Name: Beverley Aldana RN, CCDS   Dr. Federico Layne :  Please clarify the type of shock this patient is being treated/managed for:    => Shock, Hypovolemic, endotoxic, hemorrhagic in the setting of pancolitis, liver cirrohsis, end stage hepatic  requiring vasopressor, IVF, ICU care  =>Other Explanation of clinical findings  =>Unable to Determine (no explanation of clinical findings)    The medical record reflects the following clinical findings, treatment, and risk factors:    Risk Factors: ETOH cirrhosis, hepatitis, HRS, liver failure   Clinical Indicators: increasing letharrgy, hypotension, acute renal failure w/ ATN, HRS  Treatment: as above , possible palliative care. Please clarify and document your clinical opinion in the progress notes and discharge summary including the definitive and/or presumptive diagnosis, (suspected or probable), related to the above clinical findings. Please include clinical findings supporting your diagnosis.     Thank Roseann Vincent RN, CCDS

## 2017-07-13 NOTE — PROGRESS NOTES
1900-Bedside report received from [facility], [address] [place] [address].  2000-Shift assessment complete, responds to pain, no command following, bipap continued at 100%, annia gtt 170mcg, VSS, NSR, will continue to monitor. 2214-Spoke with sister scott alvarez on annia gtt, wishes to continue with additional pressors, spoke with on-call Dr. Rui Rankin new orders received for levophed, will continue to monitor. 0000-Reassessment complete, annia gtt 300mcg, levo gtt 8mcg, will continue to monitor, no other changes. 0400-Reassessment complete, titrating levo following bps.  0705-Bedside report given to [place] RN, annia gtt 300 mcg, levo gtt 60 mcg.

## 2017-07-13 NOTE — PROGRESS NOTES
Attended Critical Care Unit Interdisciplinary Rounds during which patient care was discussed.   HUMBERTO Perez, [facility], [address] [place] [address] [address]     Paging Service  [phone]

## 2017-07-13 NOTE — PROGRESS NOTES
Brief Progress Note    Consult received. Chart reviewed. Discussed w/ Dr. Mila Lopez, ICU attending, and Deepti Richardson, bedside RN. Time and input appreciated. Called [facility] and spoke directly w/ her. She is spokesperson for family. She confirmed pt has no spouse and no children. Pt has 5 siblings. In absence of AMD, majority of siblings must be in agreement for any medical decision. I confirmed that she spoke w/ 2 other siblings and they agree w/ decisions relayed to other providers. Based on the above,  Confirmed code status as DNAR/AND. Updated comments section of code status order. Also, note:  NO to intubation if respiratory distress or failure outside of an end-of-life event  YES to CPAP or BiPAP  YES to pressors    Pink sheet updated and placed on paper chart to reflect the above. Family meeting scheduled for today @ 1600. Full initial consult note to follow. Should you have questions/concerns or the need for a bedside visit by our team, please do not hesitate to contact us at [phone] Thank you for providing us w/ the opportunity to be involved in this patient's care.       Chicho Moore MD  Palliative Care Team

## 2017-07-13 NOTE — PROGRESS NOTES
[email or web address]                  Phone - [phone]   Renal Progress Note    Subjective:   Patient: Adithya Bhatt                    YOB: 1960               Date- 2017          Reason for visit: ARF    Admission Date: 7/10/2017       PROBLEMS:    · Acute renal failure secondary to multiple possibilities. Hepatorenal syndrome seems most likely. No urine sent yet for Na and creat. Bun/creat up to 26/3.8, and the patient is severely oliguric with only 60 cc UO yesterday. Agree with Dr. Ary Fernandez and Dr. Bharat Montaño that dialysis does not appear appropriate given his other medical problems. Will d/w his family. ·    · Metabolic acidosis, improved with sodium bicarb  · Hyponatremia, resolved. Now the Na is up to 145  · Hypokalemia  · SHOCK  · anemia  · hypocalcemia   · Severe hepatic encephalopathy. · ETOH abuse   · Albumin 1.1  ·    ·    PLAN:     Decrease the IV bicarb  --or d/c it  · Replete KCl  · Do not believe dialysis is appropriate but will need to d/w his family  · Minimize IV fluids         D/w the patient's RN and with Dr. Bharat Montaño. Also d/w the patient's sister, Mayra Woo at some length. She does not want dialysis nor intubation nor chest compressions. Also d/w Dr. Ary Fernandez and the ICU team.        Complaint: obtunded. On Terrell at 100 mcg. Only 60 cc UO yesterday    Review of Systems  Review of systems not obtained due to patient factors. Objective:     Visit Vitals    /43 (BP 1 Location: Left arm, BP Patient Position: At rest)    Pulse 89    Temp 98.7 °F (37.1 °C)    Resp 27    Ht 5' 10\" (1.778 m)    Wt 74.8 kg (165 lb)    SpO2 91%    BMI 23.68 kg/m2     Temp (24hrs), Av °F (36.7 °C), Min:96.8 °F (36 °C), Max:99.3 °F (37.4 °C)      701 - 1900  In: 480 [I.V.:420]  Out: 5 [Urine:5]  1901 -  0700  In: 6544.6 [I.V.:6544.6]  Out: 62 [Urine:62]    Physical Exam:  General:  Obtunded with what appears to be labored breathing.  On BIPAP  Lungs: diminished breath sounds   Heart:  regular rate and rhythm  Abdomen:  Slightly distended and firm  --this may be from the respiratory effort; no apparent discomfort. Extremities/Edema: none  Neuro: obtunded  Musculoskeletal: grossly unremarkable  : only about 10 cc urine in the vang --extremely icteric    Data Review:     Recent Labs      07/13/17   0429  07/12/17   1505  07/12/17   0329  07/11/17   1623   07/11/17   0313   WBC  19.8*   --   14.7*   --    --   12.9*   HGB  8.3*   --   8.9*  9.3*   < >  12.0*   NA  145   --   133*   --    --   125*   K  3.1*   --   3.4*   --    --   3.8   CL  110*   --   102   --    --   93*   CO2  23   --   16*   --    --   20*   BUN  26*   --   22*   --    --   17   CREA  3.77*   --   2.61*   --    --   1.87*   CA  5.5*   --   6.9*   --    --   8.0*   ALB  1.1*   --   1.6*   --    --   1.4*   PHOS  5.0*  7.6*   --    --    --   4.4   MG  2.2  2.2   --    --    --   2.6*    < > = values in this interval not displayed. Assessment:     Active Problems:    Hepatic encephalopathy ([address] (7/10/2017)        Chart reviewed. Pertinent Notes Reviewed. Medications list Personally Reviewed. Cherise Merritt, [address] [facility]  [facility] [address], [place]  [place], [address]  Phone - [phone]    Fax - [phone]  Www. [employer]                                         [facility]   [address] [facility] [address] [place] [address], [facility][note 3]  Phone - [phone]  Fax - [phone]

## 2017-07-13 NOTE — PROGRESS NOTES
0800 assessment completed. Patient does not follow any commands breathing labored on 100% Bipap. Patient on 170 mcg of Terrell. Flails head but does not do anything purposefully. Withdraws to pain in upper extremities. Minimal drops of urine output to vang. Bipap mask removed for quick mouth care and patient oxygen saturations decreased to 79%. Oxygen saturations slow to recover. 8936 Patient incontinent of small brown liquid stool patient cleaned and repositioned. 12 beat run of Vtach while turning. Patient BP and mentation unchanged. 1200 Reassessment completed Patient continues to be minimally responsive to pain. Remains on bipap 100%. 1600 Restlessness increasing patient thrashes head back in forth which pulls the bipap tubing from machine at times and dislodges mask from face. Unable to follow any commands. 1737 Patient thrashing pulled bipap tubing from machine family at bedside. Patient medicated with 1mg lorazepam for agitation. 1900 Report given to REID Plasencia RN

## 2017-07-13 NOTE — PROGRESS NOTES
7/13/2017  5:20 PM    INTENSIVIST PROGRESS NOTE:     Patient seen and evaluated   54 yo male hx of etoh presents with worsening MS  Found to have hyperbilirubinemia and colitis on abd ct  Started on iv abx, IVF  Lethargic, on bipap      Visit Vitals    /66    Pulse 89    Temp 99 °F (37.2 °C)    Resp 25    Ht 5' 10\" (1.778 m)    Wt 74.8 kg (165 lb)    SpO2 97%    BMI 23.68 kg/m2       General: lethargic, on bipap  CV: RRR  Lungs: clear    CXR: bibasilar atx    Labs reviewed    A/P:  O2/BIPAP  Empiric abx  Lactulose for elevated ammonia  IVF, bicarb drip  Pressors  Monitor renal fx, worse, renal following  Would recommend against HD due to overall prognosis  Poor prognosis, may not survive this event  Palliative care consult  Will assist on disposition planning when stable for transfer  Britt Rolle MD

## 2017-07-13 NOTE — PROGRESS NOTES
Hospitalist Progress Note    NAME: Verdene Kawasaki   :  1960   MRN:  [id number]   LOS:   2      Assessment / Plan:  Shock  Alcoholic hepatitis, Hyperbilirubinemia   Hepatic encephalopathy  ETOH abuse with alcoholic cirrhosis  Coagulopathy  -continues to worsen, now requiring pressors  -elevated AST/ALT, elevated bili, discriminant factor high  -appreciate GI, Dr. Winston Olsen evaluation  -less likely obstruction as bile ducts not dilated  -continue supportive care, continue steroids  -s/p vitamin K, follow coags  -CIWA for withdrawal  -lactulose  -overall poor prognosis, discussed with patient's sister over the phone today, will likely need to readdress goals of care, consider palliative consult    Pancolitis  GI bleed  Hypotension  -colitis on CT, GI bleeding, coagulopathy likely contributing  -monitor hgb closely stable around 9 today  -will continue abx for now    Acute renal failure with dehydration  -consider pre renal, hepatorenal, worsening  -appreciate nephrology evaluation  -continue bicarb, may need RRT      Pancreatic abnormality cannot determine mass vs pseudocyst  Anemia, chronic illness  Hyponatremia, acute  Hypokalemia, acute          Code status: Full  Prophylaxis: SCD's  Recommended Disposition: pending course     Subjective:     Chief Complaint: f/u encephalopathy  Continues to be encephalopathic    Review of Systems:  Symptom Y/N Comments  Symptom Y/N Comments   Fever/Chills    Chest Pain     Poor Appetite    Edema     Cough    Abdominal Pain     Sputum    Joint Pain     SOB/TORREZ    Pruritis/Rash     Nausea/vomit    Tolerating PT/OT     Diarrhea    Tolerating Diet     Constipation    Other       Could NOT obtain due to:      Objective:     VITALS:   Last 24hrs VS reviewed since prior progress note.  Most recent are:  Patient Vitals for the past 24 hrs:   Temp Pulse Resp BP SpO2   17 2115 - 94 29 101/54 95 %   17 2100 - 95 27 (!) 89/50 94 %   17 -  27 93/54 97 % 07/12/17 2008 - - - - 96 %   07/12/17 2002 - 96 28 (!) 88/47 97 %   07/12/17 2000 - 89 14 (!) 81/59 91 %   07/12/17 1945 - 95 28 100/57 96 %   07/12/17 1930 - 90 28 (!) 85/55 95 %   07/12/17 1915 - 88 29 102/54 95 %   07/12/17 1900 97.3 °F (36.3 °C) 95 29 90/51 98 %   07/12/17 1800 - 98 28 96/61 97 %   07/12/17 1710 - - - - 97 %   07/12/17 1700 - 99 (!) 35 102/59 96 %   07/12/17 1600 96.8 °F (36 °C) 99 30 (!) 78/49 92 %   07/12/17 1500 - (!) 101 (!) 33 98/42 93 %   07/12/17 1400 - (!) 104 (!) 33 95/55 92 %   07/12/17 1330 - 99 (!) 33 (!) 87/52 92 %   07/12/17 1300 - 99 30 (!) 85/49 94 %   07/12/17 1200 96.8 °F (36 °C) 97 (!) 34 90/53 94 %   07/12/17 1000 - (!) 105 (!) 34 92/53 96 %   07/12/17 0930 - 94 (!) 32 (!) 73/40 95 %   07/12/17 0900 - 96 (!) 32 (!) 71/42 98 %   07/12/17 0807 98.8 °F (37.1 °C) (!) 101 (!) 33 (!) 73/42 92 %   07/12/17 0800 98.8 °F (37.1 °C) - - - -   07/12/17 0717 - 98 (!) 35 (!) 86/50 93 %   07/12/17 0600 - 93 30 91/44 96 %   07/12/17 0500 95.7 °F (35.4 °C) 93 29 94/67 97 %   07/12/17 0400 - 91 29 99/61 95 %   07/12/17 0300 - 92 (!) 36 109/62 96 %   07/12/17 0200 - 97 (!) 34 108/56 96 %   07/12/17 0100 - (!) 101 (!) 34 110/59 96 %   07/12/17 0009 - (!) 101 (!) 35 97/58 95 %   07/11/17 2300 98.2 °F (36.8 °C) 100 30 93/56 94 %   07/11/17 2200 - 92 27 93/58 94 %       Intake/Output Summary (Last 24 hours) at 07/12/17 2128  Last data filed at 07/12/17 2000   Gross per 24 hour   Intake          3690.07 ml   Output               32 ml   Net          3658.07 ml        PHYSICAL EXAM:  General: Confused distress  EENT:  EOMI. ++icteric sclerae. Mucous membranes dry  Resp:  CTA bilaterally, no wheezing or rales. No accessory muscle use  CV:  Regular rhythm, no edema  GI:  Soft, non distended, non tender.  +Bowel sounds  Neurologic:  Confused, restless  Psych:   Poor insight, anxious  Skin:  No rashes, + jaundice  ________________________________________________________________________  Care Plan discussed with:    Comments   Patient x    Family  x Spoke with sister   RN x    Care Manager     Consultant                        Multidiciplinary team rounds were held today with , nursing, pharmacist and clinical coordinator. Patient's plan of care was discussed; medications were reviewed and discharge planning was addressed. ________________________________________________________________________  Total NON critical care TIME:  30   Minutes  ________________________________________________________________________  Tiesha Alanis MD     Procedures: see electronic medical records for all procedures/Xrays and details which were not copied into this note but were reviewed prior to creation of Plan. LABS:  I reviewed today's most current labs and imaging studies.   Pertinent labs include:  Recent Labs      07/12/17   0329  07/11/17   1623  07/11/17   1001  07/11/17   0313  07/10/17   1205   WBC  14.7*   --    --   12.9*  12.2*   HGB  8.9*  9.3*  10.5*  12.0*  10.4*   HCT  25.0*  25.3*  28.2*  32.7*  28.7*   PLT  108*   --    --   144*  172     Recent Labs      07/12/17   1505  07/12/17   0445  07/12/17   0329  07/11/17   0313  07/10/17   1457   NA   --    --   133*  125*  129*   K   --    --   3.4*  3.8  2.7*   CL   --    --   102  93*  95*   CO2   --    --   16*  20*  24   GLU   --    --   85  89  83   BUN   --    --   22*  17  15   CREA   --    --   2.61*  1.87*  1.69*   CA   --    --   6.9*  8.0*  7.6*   MG  2.2   --    --   2.6*  2.2   PHOS  7.6*   --    --   4.4   --    ALB   --    --   1.6*  1.4*  1.1*   TBILI   --    --   20.1*  21.3*  16.9*   SGOT   --    --   239*  187*  115*   ALT   --    --   53  47  33   INR   --   <0.9*   --    --   3.5*       Signed: Tiesha Alanis MD

## 2017-07-13 NOTE — PROGRESS NOTES
Interdisciplinary team rounds were held 7/13/2017  with the following team members:Care Management, Diabetes Treatment Specialist, Nursing, Nutrition, Pastoral Care, Pharmacy, Physician and Respiratory Therapy. Plan of care discussed. Goal: See Code status changes. Adjust medications & IV fluids. See MD orders and progress notes for further  interventions and desired outcomes.

## 2017-07-13 NOTE — DIABETES MGMT
DTC Progress Note    Recommendations/ Comments: Pt discussed with rounding team and Dr. Marcelina Savage. Pt with hypoglycemia yesterday likely secondary to elevated LFT's- improved with treatment and addition of D5 IVF. Plan to monitor BG today. NURSING : HYPOGLYCEMIC RISK ASSESSMENT    Patient is at increased risk for hypoglycemia due to to the following conditions: elevated LFT's, ARF    Noted glucose events:7/12/17    Please monitor BG levels and follow the hypoglycemia protocol for treatment. Chart reviewed on Prakash Shukla during Multidisciplinary Rounds. A1c:   No results found for: HBA1C, HGBE8, RQC4UIFE        Recent Glucose Results:   Lab Results   Component Value Date/Time    GLU 95 07/13/2017 04:29 AM    GLUCPOC 127 (H) 07/13/2017 05:50 AM    GLUCPOC 107 (H) 07/13/2017 12:53 AM    GLUCPOC 111 (H) 07/12/2017 04:59 PM        Lab Results   Component Value Date/Time    Creatinine 3.77 07/13/2017 04:29 AM     Estimated Creatinine Clearance: 22.9 mL/min (based on Cr of 3.77). Active Orders   There are no active orders of the following type(s): Diet. PO intake: No data found. Will continue to follow as needed. Thank you.   Chantel Richardson, BSN, RN, [address] [place] [address]

## 2017-07-13 NOTE — CONSULTS
Palliative Medicine Consult  Eric: [phone] ([phone])    Patient Name: Humble Juarez  YOB: 1960    Date of Initial Consult: 07/13/2017  Reason for Consult: care decisions  Requesting Provider: Dr. Sheree Vidal   Primary Care Physician: Madison Dodge MD      SUMMARY:   Humble Juarez is a 55 y.o. gentleman with a past history of longstanding EtOH abuse, HTN, GERD  who was admitted on 7/10/2017 from home with a diagnosis of hepatic encephalopathy, pancolitis, and acute renal failure. Current medical issues leading to Palliative Medicine involvement include: care decisions in setting of  PMH above  Admission dx  Agitation  Critically sick and determination of high likelihood that pt's condition will not improve     PALLIATIVE DIAGNOSES:   1. Altered mental status  2. agitation  3. Debility  4. Counseling regarding goals of care and advance care planning     PLAN:   Brief Summary of Plan  -visited w/ pt earlier in the day. No family at bedside  -called and spoke w/ Nimco Perez, pt's sister. Please refer to my note from earlier today.   -family meeting held @ 1600. Angus Fernandez (EBER) and I were present from our team.  -goals of care and advance care planning confirmed   -we will continue to establish therapeutic alliance as well as provide psychosocial support    1. Goals of Care- confirmed  Nimco Perez expresses wish that was expressed earlier in day to other providers:  continue current measures  YES to pressors  YES to BiPAP or CPAP  NO to intubation if respiratory distress or failure outside of an end-of-life event (cardiopulmonary arrest)     2. Advance Care Planning- pt's code status is DNAR/AND. Active EMR order reflects this. Pink sheet updated to reflect the above. There is no AMD.  Pt has 5 living siblings. The only one that has significant involvement in his life is Calin. She had spoken w/ 2 other siblings as spokesperson for family.   They are in agreement w/ decisions she has expressed. 3. Information Sharing-   07/13/2017  I shared w/ Rosio Fitch about our role in their care. She is unfamiliar w/ palliative care. She verbalized understanding of all the points made including the following:  -we are an interdisciplinary team serving as a bridge of communication and advocate on behalf of the patient and family when needed  -we are a support care service that, when needed, assists w/ sx mgmt  -though hospice is \"under\" palliative medicine, our service is not hospice nor does a consult visit by our service mean that hospice is being or should be considered  -we are not here to make medical decisions, and our being consulted does not equate to a change in a patient's overall condition  -our involvement is independent of clinical trajectory and/or medical decisions made    She provided a good understanding of conversations that took place earlier today w/ other providers as well as what is going on medically w/ Mr. Montrell Mayfield. She understands he does not have long. She expresses not wanting any measures that \"prolong suffering\". She shares a great deal about him. Please refer to note by Theresa Hutson earlier today. See #1 and #2. We provided context/foundation for future conversations about pt's medical care by speaking about range of medical treatment. Supportive listening was provided throughout family meeting. She became tearful at times but laughed and smiled as she shared wonderful memories including recent ones. 4. Psychosocial Support- We will continue to support patient and family during this difficult hospitalization    5. Spiritual- at this time, there are no apparent spiritual needs or concerns. Rosio Fitch confirms   6. Symptom Management- at this time, there does not appear to be symptom management for which our assistance is needed. I have discussed with patients bedside RN, Shawn Barraza. I have discussed with Dr. Alex Dill, ICU attending.    Time and input appreciated. I have discussed with our palliative care IDT. Thank you for this consult that has provided us with the opportunity to be involved in this patient's care. We will continue to follow along with you. Should you have any questions or concerns prior to our next visit at the bedside, please do not hesitate to contact us at [phone] Tyson Mondragon MD  Palliative Care Team     GOALS OF CARE / TREATMENT PREFERENCES:   [====Goals of Care====]  GOALS OF CARE:  Patient / health care proxy stated goals: continue current measures  YES to pressors  YES to BiPAP or CPAP  NO to intubation if respiratory distress or failure outside of an end-of-life event (cardiopulmonary arrest)     TREATMENT PREFERENCES:   Code Status: DNR  Code status confirmed as DO NOT ATTEMPT RESUSCITATION (DNAR) / ALLOW NATURAL DEATH (AND). Advance Care Planning:  Advance Care Planning 7/13/2017   Patient's Healthcare Decision Maker is: Legal Next of Isrrael 70   Primary Decision Maker Name Tesfaye Humphries, sister/primary cg/Spokesperson and 4 other siblings   Primary Decision Maker Phone Number [phone]   Primary Decision Maker Relationship to Patient Sibling   Confirm Advance Directive None   Patient Would Like to Complete Advance Directive Unable       Other:    The palliative care team has discussed with patient / health care proxy about goals of care / treatment preferences for patient.  [====Goals of Care====]         HISTORY:     History obtained from: family, chart    CHIEF COMPLAINT: pt is unable to meaningfully communicate cc    HPI/SUBJECTIVE:    The patient is:   [] Verbal and participatory  [x] Non-participatory due to: current condition    Pt is a 54 y/o AAM w/ PMH noted above who has had worsening mentation x ~1 wk. Pt also was intermittent moaning and grabbing his L lower belly. Pt consumes avg of 80 oz of beer per day until this wk when he was apparently too weak to drink EtOH.   Pt has had markedly reduced PO intake. Over the past few days, he has taken in little to no food. Pt has become weaker. Significant wt loss per family. In our ED, CT abd shows diffuse colitis as well as pancreatic abnormality and abnl liver architecture. Pt was confused. Elevated ammonia.       Clinical Pain Assessment (nonverbal scale for severity on nonverbal patients):   [++++ Clinical Pain Assessment++++]  [++++Pain Severity++++]: Pain: 3  [++++Pain Character++++]:   [++++Pain Duration++++]:   [++++Pain Effect++++]:   [++++Pain Factors++++]:   [++++Pain Frequency++++]:   [++++Pain Location++++]:   [++++ Clinical Pain Assessment++++]    Adult Non-Verbal Pain Assessment    Face  [] 0   No particular expression or smile  [x] 1   Occasional grimace, tearing, frowning, wrinkled forehead  [] 2   Frequent grimace, tearing, frowning, wrinkled forehead    Activity (movement)  [] 0   Lying quietly, normal position  [x] 1   Seeking attention through movement or slow, cautious movement  [] 2   Restless, excessive activity and/or withdrawal reflexes    Guarding  [] 0   Lying quietly, no positioning of hands over areas of body  [x] 1   Splinting areas of the body, tense  [] 2   Rigid, stiff    Physiology (vital signs)  [x] 0   Stable vital signs  [] 1   Change in any of the following: SBP > 20mm Hg; HR > 20/minute  [] 2   Change in any of the following: SBP > 30mm Hg; HR > 25/minute    Respiratory  [x] 0   Baseline RR/SpO2, compliant with ventilator  [] 1   RR > 10 above baseline, or 5% drop SpO2, mild asynchrony with ventilator  [] 2   RR > 20 above baseline, or 10% drop SpO2, asynchrony with ventilator    Total Non-Verbal Pain Score: 3     FUNCTIONAL ASSESSMENT:     Palliative Performance Scale (PPS):  PPS: 10       PSYCHOSOCIAL/SPIRITUAL SCREENING:     Advance Care Planning:  Advance Care Planning 7/13/2017   Patient's Healthcare Decision Maker is: Legal Next of Natalyva Erma   Primary Decision Maker Name Tesfaye Varmaer, sister/primary cg/Spokesperson and 4 other siblings   Primary Decision Maker Phone Number [phone]   Primary Decision Maker Relationship to Patient Sibling   Confirm Advance Directive None   Patient Would Like to Complete Advance Directive Unable        Any spiritual / Hinduism concerns:  [] Yes /  [x] No    Caregiver Burnout:  [] Yes /  [x] No /  [] No Caregiver Present      Anticipatory grief assessment:   [x] Normal  / [] Maladaptive       ESAS Anxiety:      ESAS Depression:          REVIEW OF SYSTEMS:     Positive and pertinent negative findings in ROS are noted above in HPI. The following systems were [] reviewed / [x] unable to be reviewed as noted in HPI  Other findings are noted below. Systems: constitutional, ears/nose/mouth/throat, respiratory, gastrointestinal, genitourinary, musculoskeletal, integumentary, neurologic, psychiatric, endocrine. Positive findings noted below. Modified ESAS Completed by: provider           Pain: 3           Dyspnea: 7           Stool Occurrence(s): 1        PHYSICAL EXAM:     From RN flowsheet:  Wt Readings from Last 3 Encounters:   07/10/17 165 lb (74.8 kg)   04/30/16 123 lb (55.8 kg)   06/25/15 123 lb 7.3 oz (56 kg)     Blood pressure 93/64, pulse 88, temperature 97.8 °F (36.6 °C), resp. rate 28, height 5' 10\" (1.778 m), weight 165 lb (74.8 kg), SpO2 99 %.     Pain Scale 1: Adult Nonverbal Pain Scale  Pain Intensity 1: 2                   Gen: in mild distress d/t discomfort of BiPAP  Critically sick  HEENT: NC/AT, BiPAP mask securely in place  Respiratory: breathing not labored, symmetric  Skin: warm, dry  Neurologic: awake but not alert, does not follow commands, GILLIS spontaneously  Psychiatric: limited exam d/t current condition  Mild agitation  Unable to demonstrate capacity regarding current medical treatment     HISTORY:     Active Problems:    Hepatic encephalopathy ([facility] [address] (7/10/2017)      Past Medical History:   Diagnosis Date    Alcohol abuse     Cirrhosis of liver ([facility] [address]  GERD (gastroesophageal reflux disease)     occasionally only; takes OTC meds prn    Hypertension     no TX now    Other ill-defined conditions     pancreatitis hx; resolved    Pancreatitis       Past Surgical History:   Procedure Laterality Date    HX OTHER SURGICAL      cataract left     KS COLONOSCOPY FLX DX W/COLLJ SPEC WHEN PFRMD  3/7/2013           Family History   Problem Relation Age of Onset    Hypertension Other       History reviewed, no pertinent family history.   Social History   Substance Use Topics    Smoking status: Current Every Day Smoker     Packs/day: 1.00    Smokeless tobacco: Not on file    Alcohol use Yes      Comment: report \"2 quarts a day\"     No Known Allergies   Current Facility-Administered Medications   Medication Dose Route Frequency    PHENYLephrine (NEOSYNEPHRINE) 30,000 mcg in 0.9% sodium chloride 250 mL infusion   mcg/min IntraVENous TITRATE    sodium chloride (NS) flush 10-30 mL  10-30 mL InterCATHeter PRN    sodium chloride (NS) flush 10 mL  10 mL InterCATHeter Q24H    sodium chloride (NS) flush 10 mL  10 mL InterCATHeter PRN    sodium chloride (NS) flush 10-40 mL  10-40 mL InterCATHeter Q8H    sodium chloride (NS) flush 20 mL  20 mL InterCATHeter PRN    heparin (porcine) pf 300 Units  300 Units InterCATHeter PRN    dextrose 5% and 0.9% NaCl infusion  50 mL/hr IntraVENous CONTINUOUS    hydrocortisone Sod Succ (PF) (SOLU-CORTEF) injection 50 mg  50 mg IntraVENous Q8H    ondansetron (ZOFRAN) injection 4 mg  4 mg IntraVENous Q4H PRN    albuterol-ipratropium (DUO-NEB) 2.5 MG-0.5 MG/3 ML  3 mL Nebulization Q2H PRN    sodium chloride (NS) flush 5-10 mL  5-10 mL IntraVENous Q8H    sodium chloride (NS) flush 5-10 mL  5-10 mL IntraVENous PRN    pantoprazole (PROTONIX) 40 mg in sodium chloride 0.9 % 10 mL injection  40 mg IntraVENous Q12H    piperacillin-tazobactam (ZOSYN) 3.375 g in 0.9% sodium chloride (MBP/ADV) 100 mL  3.375 g IntraVENous Q8H    LORazepam (ATIVAN) injection 1-2 mg  1-2 mg IntraVENous Q2H PRN    mupirocin (BACTROBAN) 2 % ointment   Both Nostrils BID    glucose chewable tablet 16 g  4 Tab Oral PRN    dextrose (D50W) injection syrg 12.5-25 g  12.5-25 g IntraVENous PRN    glucagon (GLUCAGEN) injection 1 mg  1 mg IntraMUSCular PRN    lactulose (CHRONULAC) solution 30 g  30 g Oral TID          LAB AND IMAGING FINDINGS:     Lab Results   Component Value Date/Time    WBC 19.8 07/13/2017 04:29 AM    HGB 8.3 07/13/2017 04:29 AM    PLATELET 70 32/92/2222 04:29 AM     Lab Results   Component Value Date/Time    Sodium 145 07/13/2017 04:29 AM    Potassium 3.1 07/13/2017 04:29 AM    Chloride 110 07/13/2017 04:29 AM    CO2 23 07/13/2017 04:29 AM    BUN 26 07/13/2017 04:29 AM    Creatinine 3.77 07/13/2017 04:29 AM    Calcium 5.5 07/13/2017 04:29 AM    Magnesium 2.2 07/13/2017 04:29 AM    Phosphorus 5.0 07/13/2017 04:29 AM      Lab Results   Component Value Date/Time    AST (SGOT) 1647 07/13/2017 04:29 AM    Alk. phosphatase 73 07/13/2017 04:29 AM    Protein, total 5.4 07/13/2017 04:29 AM    Albumin 1.1 07/13/2017 04:29 AM    Globulin 4.3 07/13/2017 04:29 AM     Lab Results   Component Value Date/Time    INR <0.9 07/12/2017 04:45 AM    Prothrombin time <9.0 07/12/2017 04:45 AM    aPTT 81.2 07/10/2017 02:57 PM      Lab Results   Component Value Date/Time    Iron 62 07/13/2017 04:29 AM    TIBC 59 07/13/2017 04:29 AM    Iron % saturation  07/13/2017 04:29 AM     INDETERMINATE - SENT TO REFERENCE LAB FOR ADDITIONAL TESTING.     Ferritin 1757 07/13/2017 04:29 AM      Lab Results   Component Value Date/Time    pH 7.40 07/13/2017 05:31 AM    PCO2 43 07/13/2017 05:31 AM    PO2 76 07/13/2017 05:31 AM     No components found for: [place]   Lab Results   Component Value Date/Time    CK 62 07/10/2017 02:57 PM    CK - MB 2.5 07/10/2017 02:57 PM                Total time: 90 min  Counseling / coordination time, spent as noted above: 75 min  > 50% counseling / coordination?: yes    Prolonged service was provided for  []30 min   []75 min in face to face time in the presence of the patient, spent as noted above. Time Start:   Time End:   Note: this can only be billed with 87602 (initial) or 24676 (follow up). If multiple start / stop times, list each separately.

## 2017-07-13 NOTE — PROGRESS NOTES
GI Progress Note Tiesha Andrews)  NAME:Sudhir Soliman FEN:[id number] DUR:[id number]   ATTG: Aida Saenz MD  PCP: Madison Dodge MD  Date/Time:  7/13/2017 0930  Assessment:   · EtOH cirrhosis  · EtOH hepatitis  · Hepatorenal syndrome- doubling of Cr with no urine output following 1 L NS bolus and ongoing BiCarb drip  · Elvated LFTs- c/w cirrhosis, liver failure, EtOH induced hepatitis     Plan:   · His prognosis is dismal if this is true hepatorenal syndrome (HRS) in setting of his renal failure. He is not a candidate for transplant as he was engaged in active EtOH abuse up until his current admission. For this reason Hd in HRS would not be approriate  · Supportive care  · Continue IVF  · Follow coags, CBC, CMP, NH3  · Discussion with family needed to review poor prognosis and that he will not recover based on current indicators- if this is the case conversion to DNR may be appropriate     Subjective:   Discussed with RN events overnight. Remains on pressor. No urine output. Remains on BiPap    Complaint Y/N Description   Abdominal Pain     Hematemesis n    Hematochezia n    Melena     Constipation n    Diarrhea n    Dyspepsia     Dysphagia     Jaundiced y    Nausea/vomiting       Review of Systems:  Symptom Y/N Comments  Symptom Y/N Comments   Fever/Chills n   Chest Pain     Cough n   Headaches     Sputum    Joint Pain     SOB/TORREZ    Pruritis/Rash     Tolerating Diet  NPO  Other       Could NOT obtain due to: AMS     Objective:   VITALS:   Last 24hrs VS reviewed since prior progress note.  Most recent are:  Visit Vitals    /43 (BP 1 Location: Left arm, BP Patient Position: At rest)    Pulse 89    Temp 98.7 °F (37.1 °C)    Resp 27    Ht 5' 10\" (1.778 m)    Wt 74.8 kg (165 lb)    SpO2 91%    BMI 23.68 kg/m2       Intake/Output Summary (Last 24 hours) at 07/13/17 0941  Last data filed at 07/13/17 0900   Gross per 24 hour   Intake          5424.57 ml   Output               67 ml   Net          5357.57 ml PHYSICAL EXAM:  General: WD, WN.  no acute distress    HEENT: NC, Atraumatic. PERRL. +icteric sclerae. Lungs: Tachypneic on BiPAP; CTA Bilaterally. No Wheezing/Rhonchi/Rales. Heart:  Regular  rhythm,  No murmur/Rubs/Gallops  Abdomen: Soft, Non distended, Non tender.  +Bowel sounds, no HSM  Extremities: No c/c/e  Neurologic:  CN 2-12 gi, A/O X pain only. No acute neurological distress   Psych:   No insight. Not anxious nor agitated. Lab and Radiology Data Reviewed: (see below)    Medications Reviewed: (see below)  PMH/SH reviewed - no change compared to H&P  ________________________________________________________________________  Total time spent with patient: 15 minutes ________________________________________________________________________  Care Plan discussed with:  Patient y   Family     RN y              Consultant:  berry Castillo MD     Procedures: see electronic medical records for all procedures/Xrays and details which were not copied into this note but were reviewed prior to creation of Plan.       LABS:  Recent Labs      07/13/17 0429 07/12/17 0329   WBC  19.8*  14.7*   HGB  8.3*  8.9*   HCT  22.6*  25.0*   PLT  70*  108*     Recent Labs      07/13/17 0429 07/12/17   1505  07/12/17 0329  07/11/17   0313   NA  145   --   133*  125*   K  3.1*   --   3.4*  3.8   CL  110*   --   102  93*   CO2  23   --   16*  20*   BUN  26*   --   22*  17   CREA  3.77*   --   2.61*  1.87*   GLU  95   --   85  89   CA  5.5*   --   6.9*  8.0*   MG  2.2  2.2   --   2.6*   PHOS  5.0*  7.6*   --   4.4     Recent Labs      07/13/17   0429  07/12/17   0329  07/11/17   0313  07/10/17   1457   SGOT  1647*  239*  187*  115*   AP  73  105  155*  131*   TP  5.4*  6.8  8.9*  7.2   ALB  1.1*  1.6*  1.4*  1.1*   GLOB  4.3*  5.2*  7.5*  6.1*   LPSE   --    --    --   129     Recent Labs      07/12/17   0445  07/10/17   1457   INR  <0.9*  3.5*   PTP  <9.0*  36.4*   APTT   --   81.2*      Recent Labs 07/13/17   0429   TIBC  59*   PSAT  INDETERMINATE - SENT TO REFERENCE LAB FOR ADDITIONAL TESTING.    FERR  1757*      Lab Results   Component Value Date/Time    Folate 5.3 07/13/2017 04:29 AM     Recent Labs      07/13/17   0531  07/12/17   2255   PH  7.40  7.23*   PCO2  43  47*   PO2  76*  55*     Recent Labs      07/10/17   1457   CPK  62   CKMB  2.5     Lab Results   Component Value Date/Time    Color DEBRA 07/10/2017 01:52 PM    Appearance CLOUDY 07/10/2017 01:52 PM    Specific gravity 1.011 07/10/2017 01:52 PM    pH (UA) 6.5 07/10/2017 01:52 PM    Protein NEGATIVE  07/10/2017 01:52 PM    Glucose NEGATIVE  07/10/2017 01:52 PM    Ketone 15 07/10/2017 01:52 PM    Bilirubin NEGATIVE  06/25/2015 06:10 PM    Urobilinogen 2.0 07/10/2017 01:52 PM    Nitrites NEGATIVE  07/10/2017 01:52 PM    Leukocyte Esterase NEGATIVE  07/10/2017 01:52 PM    Epithelial cells FEW 07/10/2017 01:52 PM    Bacteria NEGATIVE  07/10/2017 01:52 PM    WBC 0-4 07/10/2017 01:52 PM    RBC 0-5 07/10/2017 01:52 PM       MEDICATIONS:  Current Facility-Administered Medications   Medication Dose Route Frequency    potassium chloride 20 mEq in 50 ml IVPB  20 mEq IntraVENous ONCE    PHENYLephrine (NEOSYNEPHRINE) 30,000 mcg in 0.9% sodium chloride 250 mL infusion   mcg/min IntraVENous TITRATE    sodium chloride (NS) flush 10-30 mL  10-30 mL InterCATHeter PRN    sodium chloride (NS) flush 10 mL  10 mL InterCATHeter Q24H    sodium chloride (NS) flush 10 mL  10 mL InterCATHeter PRN    sodium chloride (NS) flush 10-40 mL  10-40 mL InterCATHeter Q8H    sodium chloride (NS) flush 20 mL  20 mL InterCATHeter PRN    heparin (porcine) pf 300 Units  300 Units InterCATHeter PRN    dextrose 5% and 0.9% NaCl infusion  100 mL/hr IntraVENous CONTINUOUS    dextrose 5% 1,000 mL with sodium bicarbonate (8.4%) 150 mEq infusion   IntraVENous CONTINUOUS    hydrocortisone Sod Succ (PF) (SOLU-CORTEF) injection 50 mg  50 mg IntraVENous Q8H    sodium bicarbonate 8.4 % (1 mEq/mL) injection        sodium bicarbonate 8.4 % (1 mEq/mL) injection        ondansetron (ZOFRAN) injection 4 mg  4 mg IntraVENous Q4H PRN    albuterol-ipratropium (DUO-NEB) 2.5 MG-0.5 MG/3 ML  3 mL Nebulization Q2H PRN    sodium chloride (NS) flush 5-10 mL  5-10 mL IntraVENous Q8H    sodium chloride (NS) flush 5-10 mL  5-10 mL IntraVENous PRN    pantoprazole (PROTONIX) 40 mg in sodium chloride 0.9 % 10 mL injection  40 mg IntraVENous Q12H    piperacillin-tazobactam (ZOSYN) 3.375 g in 0.9% sodium chloride (MBP/ADV) 100 mL  3.375 g IntraVENous Q8H    LORazepam (ATIVAN) injection 1-2 mg  1-2 mg IntraVENous Q2H PRN    mupirocin (BACTROBAN) 2 % ointment   Both Nostrils BID    glucose chewable tablet 16 g  4 Tab Oral PRN    dextrose (D50W) injection syrg 12.5-25 g  12.5-25 g IntraVENous PRN    glucagon (GLUCAGEN) injection 1 mg  1 mg IntraMUSCular PRN    lactulose (CHRONULAC) solution 30 g  30 g Oral TID

## 2017-07-13 NOTE — PROGRESS NOTES
Hospitalist Progress Note    NAME: Pankaj Vance   :  1960   MRN:  [id number]   LOS:   3      Assessment / Plan:  Shock  Alcoholic hepatitis, Hyperbilirubinemia   Hepatic encephalopathy  ETOH abuse with alcoholic cirrhosis  Coagulopathy  Acute respiratory failure on BIPAP  -discussion with family noted, patient now DNR, no dialysis  -continues to worsen, overall poor prognosis  -elevated AST/ALT, elevated bili, discriminant factor high  -appreciate GI, Dr. Manisha Chase evaluation  -less likely obstruction as bile ducts not dilated  -continue supportive care, continue steroids  -s/p vitamin K, follow coags  -CIWA for withdrawal  -lactulose    Pancolitis  GI bleed  Hypotension  -colitis on CT, GI bleeding, coagulopathy likely contributing  -abx    Acute renal failure with dehydration  -consider pre renal, hepatorenal, overall worsening now oliguric    Pancreatic abnormality cannot determine mass vs pseudocyst  Anemia, chronic illness  Hyponatremia, acute  Hypokalemia, acute          Code status: DNR  Prophylaxis: SCD's  Recommended Disposition: pending course     Subjective:     Chief Complaint: f/u encephalopathy  Worsening overnight had more hypoxia now on bipap  encephalopathic    Review of Systems:  Symptom Y/N Comments  Symptom Y/N Comments   Fever/Chills    Chest Pain     Poor Appetite    Edema     Cough    Abdominal Pain     Sputum    Joint Pain     SOB/TORREZ    Pruritis/Rash     Nausea/vomit    Tolerating PT/OT     Diarrhea    Tolerating Diet     Constipation    Other       Could NOT obtain due to:      Objective:     VITALS:   Last 24hrs VS reviewed since prior progress note.  Most recent are:  Patient Vitals for the past 24 hrs:   Temp Pulse Resp BP SpO2   17 1400 - 84 25 100/73 97 %   17 1300 - 90 22 105/67 94 %   17 1202 98.8 °F (37.1 °C) 86 30 112/64 94 %   17 1109 - 88 27 - 96 %   17 0900 - 89 27 109/43 91 %   17 0805 - 95 27 - 91 %   17 0802 - - - - 91 % 07/13/17 0800 98.7 °F (37.1 °C) 90 27 112/60 90 %   07/13/17 0700 - 89 25 109/66 97 %   07/13/17 0600 - 91 26 118/69 96 %   07/13/17 0500 - 93 23 112/74 94 %   07/13/17 0450 - - - - 95 %   07/13/17 0400 99 °F (37.2 °C) (!) 101 (!) 31 124/74 95 %   07/13/17 0300 - 98 29 107/65 96 %   07/13/17 0230 - 100 28 113/66 96 %   07/13/17 0200 - 100 30 110/64 95 %   07/13/17 0130 - 99 30 102/63 95 %   07/13/17 0100 - (!) 101 29 113/66 95 %   07/13/17 0030 - (!) 102 28 101/58 94 %   07/13/17 0015 - 100 30 109/58 95 %   07/13/17 0000 - (!) 101 28 97/59 96 %   07/12/17 2345 - (!) 102 30 103/60 96 %   07/12/17 2330 99.3 °F (37.4 °C) 99 29 100/60 95 %   07/12/17 2317 - - - - 93 %   07/12/17 2315 - 94 (!) 31 95/53 92 %   07/12/17 2300 - 100 27 95/56 92 %   07/12/17 2250 - 89 (!) 31 - (!) 66 %   07/12/17 2245 - (!) 103 27 95/70 (!) 81 %   07/12/17 2230 - (!) 101 30 101/55 95 %   07/12/17 2200 - 100 28 94/48 94 %   07/12/17 2115 - 94 29 101/54 95 %   07/12/17 2100 - 95 27 (!) 89/50 94 %   07/12/17 2015 - 95 27 93/54 97 %   07/12/17 2008 - - - - 96 %   07/12/17 2002 - 96 28 (!) 88/47 97 %   07/12/17 2000 - 89 14 (!) 81/59 91 %   07/12/17 1945 - 95 28 100/57 96 %   07/12/17 1930 - 90 28 (!) 85/55 95 %   07/12/17 1915 - 88 29 102/54 95 %   07/12/17 1900 97.3 °F (36.3 °C) 95 29 90/51 98 %   07/12/17 1800 - 98 28 96/61 97 %   07/12/17 1710 - - - - 97 %   07/12/17 1700 - 99 (!) 35 102/59 96 %   07/12/17 1600 96.8 °F (36 °C) 99 30 (!) 78/49 92 %   07/12/17 1500 - (!) 101 (!) 33 98/42 93 %       Intake/Output Summary (Last 24 hours) at 07/13/17 1439  Last data filed at 07/13/17 1202   Gross per 24 hour   Intake          4883.41 ml   Output               50 ml   Net          4833.41 ml        PHYSICAL EXAM:  General: Confused in mild distress  EENT:  EOMI. +icteric sclerae. Mucous membranes dry  Resp:  CTA bilaterally, no wheezing or rales. No accessory muscle use  CV:  Regular rhythm, no edema  GI:  Soft, non distended, non tender.  +Bowel sounds  Neurologic:  Confused, restless  Psych:   Poor insight, anxious  Skin:  No rashes, + jaundice  ________________________________________________________________________  Care Plan discussed with:    Comments   Patient x    Family      RN x    Care Manager     Consultant                        Multidiciplinary team rounds were held today with , nursing, pharmacist and clinical coordinator. Patient's plan of care was discussed; medications were reviewed and discharge planning was addressed. ________________________________________________________________________  Total NON critical care TIME:  20   Minutes  ________________________________________________________________________  King Vannessa MD     Procedures: see electronic medical records for all procedures/Xrays and details which were not copied into this note but were reviewed prior to creation of Plan. LABS:  I reviewed today's most current labs and imaging studies. Pertinent labs include:  Recent Labs      07/13/17 0429 07/12/17 0329 07/11/17   1623   07/11/17   0313   WBC  19.8*  14.7*   --    --   12.9*   HGB  8.3*  8.9*  9.3*   < >  12.0*   HCT  22.6*  25.0*  25.3*   < >  32.7*   PLT  70*  108*   --    --   144*    < > = values in this interval not displayed.      Recent Labs      07/13/17   0429 07/12/17   1505  07/12/17   0445  07/12/17   0329  07/11/17   0313  07/10/17   1457   NA  145   --    --   133*  125*  129*   K  3.1*   --    --   3.4*  3.8  2.7*   CL  110*   --    --   102  93*  95*   CO2  23   --    --   16*  20*  24   GLU  95   --    --   85  89  83   BUN  26*   --    --   22*  17  15   CREA  3.77*   --    --   2.61*  1.87*  1.69*   CA  5.5*   --    --   6.9*  8.0*  7.6*   MG  2.2  2.2   --    --   2.6*  2.2   PHOS  5.0*  7.6*   --    --   4.4   --    ALB  1.1*   --    --   1.6*  1.4*  1.1*   TBILI  15.8*   --    --   20.1*  21.3*  16.9*   SGOT  1647*   --    --   239*  187*  115*   ALT  314*   --    --   53 47  33   INR   --    --   <0.9*   --    --   3.5*       Signed: Dai Medina MD

## 2017-07-13 NOTE — PROGRESS NOTES
Palliative Medicine Psychosocial Assessment  [place]: [phone] ([phone])  [facility]: [phone] ([phone])        Sources of Information:    []Patient  [x]Family  [x]Staff  [x]Medical Record    Medical/Physical Functioning: Active Problems:    Hepatic encephalopathy ([address] (7/10/2017)        Advance Care Planning:  Advance Care Planning 7/13/2017   Patient's Healthcare Decision Maker is: Legal Next of Isrrael Ritchie   Primary Decision Maker Name Geoff Leblanc, sister/primary cg/Spokesperson and 4 other siblings   Primary Decision Maker Phone Number [phone]   Primary Decision Maker Relationship to Patient Sibling   Confirm Advance Directive None   Patient Would Like to Complete Advance Directive Unable     Mental Status: On bipap, moments of aggitation  []Alert  []Lethargic  []Unresponsive  Oriented to:  []Person  []Place  []Time  []Situation      Barriers to Learning:    []Language  []Developmental  []Cognitive  [x]Altered Mental Status  []Visual/Hearing Impairment  []Unable to Read/Write  []Motivational   []No Barriers Identified  []Other:    Relationship Status:  [x]Single  []  []Significant Other/Life Partner  []  []  []      Living Circumstances:  []Lives Alone  [x]Family/Significant Other in Household  []Roommates  []Children in the Home  []Paid Caregivers  []Assisted Living Facility/Group Home  []Skilled [address]  []Homeless  []Incarcerated  []Environmental/Care Concerns  []Other: Pt has resided with his sister Rosa M Zamudio for the past 8 years. Support System:    []Strong  []Fair  [x]Limited to caring sister. Other siblings are involved in various ways--complicated dynamics. Financial/Legal Concerns:    []Uninsured  [x]Limited Income/Resources  []Non-Citizen  []No Concerns Identified  []Financial POA:    []Other: has not worked much    Rastafarian/Spiritual/Existential: belief in God. Prayers are appreciated.  He prays in way which is concise and as direct conversation to the [address] [x]Strong Sense of Spirituality  []Involved in [facility]  [x]Request  Visit - will ask  Fellow to visit tomorrow  []Expressing [place]  []No Concerns Identified    Coping with Illness:         Patient: Family/Caregiver:   Understanding and Acceptance of Illness/Prognosis  [] [x]   Strong Sense of Resilience [] [x]   Self Reflection [] [x]   Engaged Support System [] [x]limited to friends   Does not Readily Discuss Illness [] []   Denial of Terminal Status [] []   Anger [] []   Depression [] []   Anxiety/Fear [] []   Bargaining [] []   Recent Diagnosis/Prognosis [] [x]   Difficulties with Body Image [] []   Loss of Identity [] []   Excessive Substance Use [x] []   Mental Health History []? []   Enmeshed Relationships [] []   History of Loss [] [x]   Anticipatory Grief [] [x]adaptive   Concern for Complicated Grief [] []no   Suicidal Ideation or Plan [] []   Unable to assess [x] []                 Narrative: Dr. Lisa Vasquez MD and this LCSW met with Pt's sister/spokesperson for siblings today. Pt is on bipap and responsiveness is to discomfort of mask at times. Pt is not interacting or engaging in conversations. His nok medical surrogates are acting on his behalf. Pt's sister Pat Abad) shared about Pt:    Pt has a vivid imagination. He made up some wonderful stories, per sister. He is kind with generous heart. He is a [employer] fan. When he was younger, Pt chose his own path and had to leave home. He spent time on the streets/homeless. Family would look for him and try to bring him home but eventually alcoholism took over. Years had past when siblings did not hear from Pt then about 8 years ago a relative reached out to [place] and Pt came to live with her.  OSLO shows love for her brother-she provided shelter, would make his requested foods, provide requested meal items, encourage him to stop etoh use, and advocate/search for community resources to help Pt conquer ETOH abuse and improve well being (catarct surgery, steps towards getting dentures). Aki Arroyo is youngest of originally 9 children. Their father  when many were young and their mom raised children as single parent. There are currently 5 siblings of Pt, but all look to Calin as the responsible, strong person. Palliative team provided empathetic listening. Affirmed Tangee's love and cg behaviors while also reinforcing Pt is responsible for his actions and health. SW acknowledged tragedy of disease of ETOH and how has ripple effects on all loved ones. Goals/Plan:   Request Palliative  Fellow to visit tomorrow for spiritual support. Sister said pt would appreciate prayer. Palliative team will continue to follow for psychosocial support and engage in goc conversations as appropriate. Thank you for including Palliative Medicine in Mr. August's care.       Lauren Muñoz, [address] ([phone])  Work cell: [phone]

## 2017-07-14 NOTE — PROGRESS NOTES
Provided supportive presence and empathic listening as Elvis Wiggins and another sister shared stories about patient. They expressed gratitude that he had reconnected with family. Elvis Wiggins spoke of how helpful he was to her. They each expressed in their own way their belief that he is now with God and free of suffering. Offered condolences and assurance of prayer.   HUMBERTO Fisher, [facility], [address] [place] [address] [address]     Paging Service  [phone])

## 2017-07-14 NOTE — ROUTINE PROCESS
Bedside and Verbal shift change report received from REID Plasencia RN (offgoing nurse). Report included the following information SBAR, Kardex, ED Summary, Procedure Summary, Intake/Output, MAR, Accordion, Recent Results, Med Rec Status and Cardiac Rhythm NSR. The NeoSynephrine is at it's maximum dosage. 0820: His systolic blood pressure is reading <90 mmHg, therefore I increased the Levophed to 80mcq/min  0825: I spoke with his sister Clari Jackson re: his heart rate decreasing. 0831: at the bedside to pronounce him. 0915:Family at the bedside.

## 2017-07-14 NOTE — DISCHARGE SUMMARY
Hospitalist Discharge Summary     Patient ID:  Marquis Johnston  [id number]  55 y.o.  1960    PCP on record: Madison Dodge MD    Admit date: 7/10/2017  Discharge date: 7/14/2017      DISCHARGE DIAGNOSES  DISCHARGE SUMMARY/HOSPITAL COURSE: for full details see H&P, daily progress notes, labs, consult notes. Acute alcoholic hepatitis, hyperbilirubinemia   Shock - consider septic, circulatory  Acute renal failure with dehydration  Hepatic encephalopathy  ETOH abuse with alcoholic cirrhosis  Coagulopathy  Acute respiratory failure on BIPAP  Pancolitis  GI bleed  Pancreatic abnormality cannot determine mass vs pseudocyst  Anemia, chronic illness  Hyponatremia, acute  Hypokalemia, acute     Patient had worsening of his liver function and kidney function. Patient's family decided for no escalation of care and code status changed to DNR.         CONSULTATIONS:  IP CONSULT TO GASTROENTEROLOGY  IP CONSULT TO NEPHROLOGY    Excerpted HPI from H&P of Sean Alvares MD:  Dulce Powers is a 55 y.o.  male with known history as listed below presents to ED with complaint noted above. Available records were reviewed at the time of H&P. Patient with known hx of ETOH abuse and pancreatitis with hepatic encephalopathy and GERD with possible pancreatic mass in 2012 p/w jaundice. Family notes worsened mentation this week as well and intermittent moaning and grabbing of left lower belly. No fevers. Has continued to consume 80oz of beer daily until this week when he was too weak to drink all of the beer. Very poor PO intake of foods. Family notes weakness of voice and body, ++weight loss as well but they could not quantitate the amount but believe it is accelerating over this last month. In ED noted on CT abd to have diffuse colitis and pancreatic abnormality and abnormal liver architecture. He was confused with elevated ammonia. We were asked to admit for work up and evaluation of the above problems.        Signed:  Luli Thompson Pili Aranda MD

## 2017-07-14 NOTE — PROGRESS NOTES
7/14/2017    INTENSIVIST PROGRESS NOTE:     Patient seen and evaluated   56 yo male hx of etoh presents with worsening MS  Found to have hyperbilirubinemia and colitis on abd ct  Started on iv abx, IVF  Lethargic, on bipap   Agonal breathing     Visit Vitals    BP 94/50    Pulse 89    Temp 97.5 °F (36.4 °C)    Resp (!) 33    Ht 5' 10\" (1.778 m)    Wt 74.8 kg (165 lb)    SpO2 91%    BMI 23.68 kg/m2       General: unresponsive, on bipap  CV: RRR  Lungs: clear      Labs reviewed    A/P:  O2/BIPAP  Empiric abx  Lactulose for elevated ammonia  IVF, bicarb drip  Pressors  Monitor renal fx, worse, renal following  Not doing HD due to overall prognosis  Poor prognosis, may not survive this event  Palliative care following  DNR  Will assist on disposition planning when stable for transfer  Alban Mathis MD  CC TIME 35 MINUTES

## 2017-07-14 NOTE — PROGRESS NOTES
Patient examined  No pulse  No BS  No respiratory effort  No electrical activity on monitor  Patient pronounced at [address] MD

## 2017-07-14 NOTE — PROGRESS NOTES
GI Progress Note Vinny Ferrer  Rocky Newman HVZ:[id number] TXC:[id number]   ATTG: Jyotsna Caba MD  PCP: Emily Schumacher MD  Date/Time:  7/14/2017 065  Assessment:   · EtOH cirrhosis  · EtOH hepatitis  · Hepatorenal syndrome- doubling of Cr with no urine output following 1 L NS bolus and ongoing BiCarb drip  · Elvated LFTs- c/w cirrhosis, liver failure, EtOH induced hepatitis     Plan:   · His prognosis is dismal if this is true hepatorenal syndrome (HRS) in setting of his renal failure. He is not a candidate for transplant as he was engaged in active EtOH abuse up until his current admission. For this reason Hd in HRS would not be approriate  · Supportive care  · Continue IVF  · Follow coags, CBC, CMP, NH3  · Discussion with family needed to review poor prognosis and that he will not recover based on current indicators- if this is the case consideration for withdrawal of care may be appropriate     Subjective:   Discussed with RN events overnight. Now on two pressor. 10cc urine output. Remains on BiPap    Complaint Y/N Description   Abdominal Pain     Hematemesis n    Hematochezia n    Melena     Constipation n    Diarrhea n    Dyspepsia     Dysphagia     Jaundiced y    Nausea/vomiting       Review of Systems:  Symptom Y/N Comments  Symptom Y/N Comments   Fever/Chills n   Chest Pain     Cough n   Headaches     Sputum    Joint Pain     SOB/TORREZ    Pruritis/Rash     Tolerating Diet  NPO  Other       Could NOT obtain due to: AMS     Objective:   VITALS:   Last 24hrs VS reviewed since prior progress note. Most recent are:  Visit Vitals    BP (!) 80/51    Pulse 86    Temp 97.5 °F (36.4 °C)    Resp (!) 31    Ht 5' 10\" (1.778 m)    Wt 74.8 kg (165 lb)    SpO2 95%    BMI 23.68 kg/m2       Intake/Output Summary (Last 24 hours) at 07/14/17 0654  Last data filed at 07/14/17 0600   Gross per 24 hour   Intake          3709.82 ml   Output               25 ml   Net          3684.82 ml     PHYSICAL EXAM:  General: WD, WN. no acute distress    HEENT: NC, Atraumatic. PERRL. +icteric sclerae. Lungs: Tachypneic on BiPAP; CTA Bilaterally. No Wheezing/Rhonchi/Rales. Heart:  Regular  rhythm,  No murmur/Rubs/Gallops  Abdomen: Soft, Non distended, Non tender.  +No BS  Extremities: No c/c/e  Neurologic:  CN 2-12 gi, A/O X pain only. No acute neurological distress   Psych:   No insight. Not anxious nor agitated. Lab and Radiology Data Reviewed: (see below)    Medications Reviewed: (see below)  PMH/SH reviewed - no change compared to H&P  ________________________________________________________________________  Total time spent with patient: 15 minutes ________________________________________________________________________  Care Plan discussed with:  Patient y   Family     RN y              Consultant:       Katharine Watkins MD     Procedures: see electronic medical records for all procedures/Xrays and details which were not copied into this note but were reviewed prior to creation of Plan. LABS:  Recent Labs      07/14/17 0440 07/13/17 0429   WBC  24.6*  19.8*   HGB  9.3*  8.3*   HCT  26.6*  22.6*   PLT  65*  70*     Recent Labs      07/14/17   0440  07/13/17   0429  07/12/17   1505  07/12/17   0329   NA  144  145   --   133*   K  3.6  3.1*   --   3.4*   CL  109*  110*   --   102   CO2  13*  23   --   16*   BUN  29*  26*   --   22*   CREA  5.55*  3.77*   --   2.61*   GLU  112*  95   --   85   CA  5.0*  5.5*   --   6.9*   MG   --   2.2  2.2   --    PHOS  7.8*  5.0*  7.6*   --      Recent Labs      07/14/17 0440 07/13/17 0429 07/12/17 0329   SGOT   --   1647*  239*   AP   --   73  105   TP   --   5.4*  6.8   ALB  1.0*  1.1*  1.6*   GLOB   --   4.3*  5.2*     Recent Labs      07/12/17 0445   INR  <0.9*   PTP  <9.0*      Recent Labs      07/13/17 0429   TIBC  59*   PSAT  INDETERMINATE - SENT TO REFERENCE LAB FOR ADDITIONAL TESTING.    FERR  1757*      Lab Results   Component Value Date/Time    Folate 5.3 07/13/2017 04:29 AM Recent Labs      07/13/17   0531  07/12/17   2255   PH  7.40  7.23*   PCO2  43  47*   PO2  76*  55*     No results for input(s): CPK, CKMB in the last 72 hours.     No lab exists for component: TROPONINI  Lab Results   Component Value Date/Time    Color DEBRA 07/10/2017 01:52 PM    Appearance CLOUDY 07/10/2017 01:52 PM    Specific gravity 1.011 07/10/2017 01:52 PM    pH (UA) 6.5 07/10/2017 01:52 PM    Protein NEGATIVE  07/10/2017 01:52 PM    Glucose NEGATIVE  07/10/2017 01:52 PM    Ketone 15 07/10/2017 01:52 PM    Bilirubin NEGATIVE  06/25/2015 06:10 PM    Urobilinogen 2.0 07/10/2017 01:52 PM    Nitrites NEGATIVE  07/10/2017 01:52 PM    Leukocyte Esterase NEGATIVE  07/10/2017 01:52 PM    Epithelial cells FEW 07/10/2017 01:52 PM    Bacteria NEGATIVE  07/10/2017 01:52 PM    WBC 0-4 07/10/2017 01:52 PM    RBC 0-5 07/10/2017 01:52 PM       MEDICATIONS:  Current Facility-Administered Medications   Medication Dose Route Frequency    PHENYLephrine (NEOSYNEPHRINE) 100,000 mcg in 0.9% sodium chloride 250 mL infusion   mcg/min IntraVENous TITRATE    NOREPINEPHrine (LEVOPHED) 16 mg/250 ml D5W infusion  2-200 mcg/min IntraVENous TITRATE    sodium chloride (NS) flush 10-30 mL  10-30 mL InterCATHeter PRN    sodium chloride (NS) flush 10 mL  10 mL InterCATHeter Q24H    sodium chloride (NS) flush 10 mL  10 mL InterCATHeter PRN    sodium chloride (NS) flush 10-40 mL  10-40 mL InterCATHeter Q8H    sodium chloride (NS) flush 20 mL  20 mL InterCATHeter PRN    heparin (porcine) pf 300 Units  300 Units InterCATHeter PRN    dextrose 5% and 0.9% NaCl infusion  50 mL/hr IntraVENous CONTINUOUS    hydrocortisone Sod Succ (PF) (SOLU-CORTEF) injection 50 mg  50 mg IntraVENous Q8H    ondansetron (ZOFRAN) injection 4 mg  4 mg IntraVENous Q4H PRN    albuterol-ipratropium (DUO-NEB) 2.5 MG-0.5 MG/3 ML  3 mL Nebulization Q2H PRN    sodium chloride (NS) flush 5-10 mL  5-10 mL IntraVENous Q8H    sodium chloride (NS) flush 5-10 mL 5-10 mL IntraVENous PRN    pantoprazole (PROTONIX) 40 mg in sodium chloride 0.9 % 10 mL injection  40 mg IntraVENous Q12H    piperacillin-tazobactam (ZOSYN) 3.375 g in 0.9% sodium chloride (MBP/ADV) 100 mL  3.375 g IntraVENous Q8H    LORazepam (ATIVAN) injection 1-2 mg  1-2 mg IntraVENous Q2H PRN    mupirocin (BACTROBAN) 2 % ointment   Both Nostrils BID    glucose chewable tablet 16 g  4 Tab Oral PRN    dextrose (D50W) injection syrg 12.5-25 g  12.5-25 g IntraVENous PRN    glucagon (GLUCAGEN) injection 1 mg  1 mg IntraMUSCular PRN    lactulose (CHRONULAC) solution 30 g  30 g Oral TID

## 2017-07-14 NOTE — PROGRESS NOTES
Spiritual Care Assessment/Progress Notes    Everardo Serrato [id number]  [id number]    1960  55 y.o.  male    Patient Telephone Number: [phone] (home)   Church Affiliation: [employer]   Language: [place]   Extended Emergency Contact Information  Primary Emergency Contact: Calin August  Address: [address] [place] [address] [address] [facility], [address]  [address] [facility] Phone: [phone]  Mobile Phone: [phone]  Relation: Sister  Secondary Emergency Contact: [address] Phone: [phone]  Relation: Brother   Patient Active Problem List    Diagnosis Date Noted    Altered mental status 07/13/2017    Agitation 07/13/2017    Debility 07/13/2017    Counseling regarding advanced care planning and goals of care 07/13/2017    Hepatic encephalopathy ([facility] [address] 07/10/2017    Abdominal pain, other specified site 04/08/2012    Pancreatic mass 04/08/2012    Alcohol abuse 04/08/2012        Date: 7/14/2017       Level of Church/Spiritual Activity:  []         Involved in ravin tradition/spiritual practice    []         Not involved in ravin tradition/spiritual practice  []         Spiritually oriented    []         Claims no spiritual orientation    []         seeking spiritual identity  []         Feels alienated from Christian practice/tradition  []         Feels angry about Christian practice/tradition  []         Spirituality/Christian tradition   a resource for coping at this time.   [x]         Not able to assess due to medical condition    Services Provided Today:  []         crisis intervention    []         reading Scriptures  []         spiritual assessment    [x]         prayer  []         empathic listening/emotional support  []         rites and rituals (cite in comments)  []         life review     []         Christian support  []         theological development    []         advocacy  []         ethical dialog     []         blessing  [] bereavement support    []         support to family  []         anticipatory grief support   []         help with AMD  []         spiritual guidance    []         meditation      Spiritual Care Needs  []         Emotional Support  []         Spiritual/Anabaptism Care  []         Loss/Adjustment  []         Advocacy/Referral                /Ethics  []         No needs expressed at               this time  []         Other: (note in               comments)  [address]  []         Follow up visits with               pt/family  []         Provide materials  []         Schedule sacraments  []         Contact Community               Clergy  []         Follow up as needed  []         Other: (note in               comments)     Comments: While walking through CCU, was notified patient had  minutes before. No family present. Offered silent prayer at bedside. Chart review noted sister Jerzy Hurst told Palliative SW that patient would appreciate prayer.   Consulted with [place], RN who will page if family elects to come to the hospital.  HUMBERTO Mejía, [facility], [address] [place] [address] [address]     Paging Service  [phone])

## 2017-07-17 LAB
BACTERIA SPEC CULT: NORMAL
SERVICE CMNT-IMP: NORMAL